# Patient Record
Sex: FEMALE | Race: WHITE | NOT HISPANIC OR LATINO | URBAN - METROPOLITAN AREA
[De-identification: names, ages, dates, MRNs, and addresses within clinical notes are randomized per-mention and may not be internally consistent; named-entity substitution may affect disease eponyms.]

---

## 2024-02-10 ENCOUNTER — HOSPITAL ENCOUNTER (OUTPATIENT)
Dept: RADIOLOGY | Facility: HOSPITAL | Age: 39
Discharge: HOME | End: 2024-02-10
Payer: MEDICAID

## 2024-02-10 DIAGNOSIS — M26.603 BILATERAL TEMPOROMANDIBULAR JOINT DISORDER: ICD-10-CM

## 2024-02-10 PROCEDURE — 70336 MAGNETIC IMAGE JAW JOINT: CPT

## 2024-02-10 PROCEDURE — 70336 MAGNETIC IMAGE JAW JOINT: CPT | Performed by: RADIOLOGY

## 2024-03-01 NOTE — PROGRESS NOTES
Patient: Marilyn Acosta  : 1985 AGE: 38 y.o. SEX:female   MRN: [id number]   Provider: JULIETTE Wolfe-CNP     Location [facility]   Service Date: 3/4/2024     PCP: Chas Paniagua MD   Referred by: Carlos Walsh DDS          [facility]  New Visit Note      HISTORY OF PRESENT ILLNESS     Marilyn Acosta is a 38 y.o. female with a h/o Depression, Anxiety, TMJ, Vitamin D def, Fibromyalgia, positive ANTONIA, chronic neck and low back pain/arthritis s/p cervical disk surgery x2 (2018 and 2019) , + Raynaud's, ADHD on Adderall, GERD, allergic rhinitis who presents to [facility] for a comprehensive sleep medicine evaluation with concerns of RADHA re evaluation.     Previously followed by [facility] ENT. Currently following with Dr. Walsh ([facility]). Requesting new sleep study. Patient reports loud snoring, gasping for air in sleep, multiple nighttime awakenings, un freshed sleep, EDS.      Extensive RADHA history- originally dx around . S/p OR 14 septoplasty, bilateral inferior turbinate reduction, UPPP, midline glossectomy for RADHA (AHI16, did not tolerate CPAP). Failed CPAP in past despite multiple mask and pressure settings. Had DISE in 2018- patient's insurance did not approve Inspire surgery at that time. She was offered options of MMA surgery or Mandibular advancement device but put things on hold during the Covid pandemic. Reports constant teeth grinding/clenching and has now been following with Dr. Walsh at [facility] orthodont[facility].       SLEEP STUDY HISTORY (personally reviewed raw data such as interpretation report, data sheet, hypnogram, and titration table if available and applicable)    - PSG 7/3/22 at [facility]- showing mild RADHA with RDI of 6.3 by CMS criteria vs. an AHI of 7.7 by AASM criteria, obstructive events, worse in REM sleep, and not associated with oxyhemoglobin desaturations to a gena of 92.0%. (BMI  24.6 kg/m2; ESS 12 out of 24 during this study)  (PSG (8/14) with an AHI of 16.2, PSG (9/17) with CPAP at 11 cm H2O adequate,  s/p septoplasty, UP3 and BOT reduction, PSG (4/15) with an AHI of 12.5, PSG (6/15) with  CPAP 4-8 cm H2O not effective, PSG (8/18) with an AHI of 1.4 by CMS criteria vs. 5.3 by AASM criteria, PSG (9/18) with an AHI of 0.7 by CMS criteria vs 19.8 by AASM criteria).     DISE findings 8/27/2018 [facility] : Incomplete and partial anterior-to-posterior obstruction at the velopharynx and oropharynx with stable lateralized pharyngeal sidewalls. No concentric collapse. In the setting of mild retrognathia, there is a complete AP base of tongue ptosis and obstruction with no lingual tonsil hypertrophy and no epiglottic obstruction. By performing a moderate to significant amount of jaw thrust, her base of tongue obstruction alleviates. Neck extension does not improve the base of tongue obstruction significantly.     SLEEP-WAKE SCHEDULE    Sleep schedule  on weekdays / work days:  Usual Bedtime:  11pm  Subjective sleep latency: 30min  # of awakenings: Yes, about 3 times a night  Wake time:  7:15am  Naps: No  Average sleep duration (excluding naps): 7 hours    Sleep schedule on weekends/non work days :  same as weekdays    SLEEP ENVIRONMENT  Sleep status: sleeps with   Preferred sleep position: stomach and side  Room is dark: Yes  Room is quiet: Yes  Room is cool: Yes  Bed comfort: good    SLEEP HABITS  Caffeine consumption: Yes, 1 cups/day  Alcohol consumption: Yes, rarely (occasional)  Smoking: Yes (1 pack per week)   Marijuana: denies  Sleep aids: denies    SLEEP ROS  Sleep Initiation: no problems going to sleep    Sleep Maintenance: wakes up about 3 times per night and easily returns to sleep after awakening  Problems staying asleep associated with: Problems Staying Asleep: breathing problems, no clear reason, and chronic pain    Breathing during sleep: snoring, witnessed apneas,  gasping/choking for air, mouth breathing, night sweats, and teeth grinding      Sleep-related behaviors: DENIES  dreams upon falling asleep  hypnagogic/hypnopompic hallucinations  sleep paralysis  sleep attacks  symptoms of cataplexy  sleep walking  kicking, punching, or acting out dreams  sleep eating  nightmares      Daytime Symptoms  On awakening patient reports:  stimulant use (see med list)    Patient report some daytime symptoms including: DAYTIME SYMPTOMS: reports excessive daytime sleepiness sleep inertia irritability during the day difficulty with memory or concentration during the day  Patient denies daytime symptoms including: Denies: late to work/school due to sleepiness feeling sleepy when driving  Fatigue: struggles to carry out day to day responsibilities.    RLS screen: Patient denies RLS symptoms.    WEIGHT: lost 30lbs in 1 year      ESS: 11  CLOTILDE: 18    REVIEW OF SYSTEMS     All other systems have been reviewed and are negative.    ALLERGIES     No Known Allergies    MEDICATIONS     Current Outpatient Medications   Medication Sig Dispense Refill    amphetamine-dextroamphetamine XR (Adderall XR) 30 mg 24 hr capsule Take 1 capsule (30 mg) by mouth once daily in the morning. Do not crush or chew.      buPROPion XL (Wellbutrin XL) 150 mg 24 hr tablet Take 1 tablet (150 mg) by mouth once daily. Do not crush, chew, or split.      busPIRone (Buspar) 5 mg tablet Take by mouth 2 times a day.      cevimeline (Evoxac) 30 mg capsule Take 1 capsule (30 mg) by mouth 3 times a day.      erenumab (Aimovig Autoinjector) 140 mg/mL injection Inject 1 mL (140 mg) under the skin every 28 (twenty-eight) days.      famotidine (Pepcid) 40 mg tablet Take by mouth.      hydroxychloroquine (Plaquenil) 200 mg tablet Take by mouth.      hydrOXYzine HCL (Atarax) 10 mg tablet Take by mouth.      pentoxifylline (Trental) 400 mg ER tablet Take by mouth 3 times a day with meals. Do not crush, chew, or split.      pregabalin (Lyrica)  225 mg capsule Take 1 capsule (225 mg) by mouth 2 times a day.      spironolactone (Aldactone) 100 mg tablet Take 1 tablet (100 mg) by mouth once daily.      traMADol (Ultram) 50 mg tablet Take by mouth.      valACYclovir (Valtrex) 1 gram tablet Take 1 tablet (1,000 mg) by mouth 2 times a day.       No current facility-administered medications for this visit.       PAST HISTORIES     PERTINENT PAST MEDICAL HISTORY: See HPI    PERTINENT PAST SURGICAL HISTORY for Sleep Medicine:    bariatric surgery in [place] in 2019      ACDF, ANTERIOR CERVICAL N/A 5/24/2018   Procedure: ACDF, ANTERIOR CERVICAL// SSEPS, 6/7; Surgeon: Trevon Dennis MD; Location: PERIOPERATIVE SERVICES; Service: Orthopaedics    BRONCHOSCOPY, FLEXIBLE, DRUG INDUCED SLEEP ENDOSCOPY (DISE) N/A 8/27/2018   Procedure: BRONCHOSCOPY, FLEXIBLE, DRUG INDUCED SLEEP ENDOSCOPY (DISE); Surgeon: Lul Pastrana MD; Location: [facility] [place]; Service: Otolaryngology    GLOSSECTOMY, PARTIAL N/A 11/26/2014   Procedure: GLOSSECTOMY, PARTIAL; Surgeon: Krissy Malik MD; Location: PERIOPERATIVE SERVICES; Service: Otolaryngology    REDUCTION, TURBINATE Bilateral 11/26/2014   Procedure: REDUCTION, TURBINATE; Surgeon: Krissy Malik MD; Location: PERIOPERATIVE SERVICES; Service: Otolaryngology    SEPTOPLASTY N/A 11/26/2014   Procedure: SEPTOPLASTY; Surgeon: rKissy Malik MD; Location: PERIOPERATIVE SERVICES; Service: Otolaryngology    TONSILLECTOMY Bilateral 11/26/2014   Procedure: TONSILLECTOMY; Surgeon: Krissy Malik MD; Location: PERIOPERATIVE SERVICES; Service: Otolaryngology    TRANSPOSITION, NERVE, ULNAR Left 12/1/2017   Procedure: DECOMPRESSION POSSIBLE TRANSPOSITION, NERVE, ULNAR; Surgeon: Tahir Burks MD; Location: [facility] [place]; Service: Hand    UVULOPALATOPHARYNGOPLASTY N/A 11/26/2014   Procedure: UVULOPALATOPHARYNGOPLASTY; Surgeon: Krissy Malik MD; Location: PERIOPERATIVE SERVICES; Service: Otolaryngology     PERTINENT FAMILY  "HISTORY for Sleep Medicine:  Patient denies family history of any sleep disorder.    PERTINENT SOCIAL HISTORY:  She  reports that she has been smoking cigarettes. She has been smoking an average of .25 packs per day. She has never used smokeless tobacco. She reports that she does not currently use alcohol. No history on file for drug use. 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     Active Problems, Allergy List, Medication List, and PMH/PSH/FH/Social Hx have been reviewed and reconciled in chart. No significant changes unless documented in the pertinent chart section. Updates made when necessary.     PHYSICAL EXAM     VITAL SIGNS: /78 (BP Location: Left arm, Patient Position: Sitting, BP Cuff Size: Adult)   Pulse 93   Temp 37.2 °C (99 °F) (Temporal)   Resp 16   Ht 1.626 m (5' 4\")   Wt 54.4 kg (120 lb)   SpO2 98%   BMI 20.60 kg/m²       CURRENT WEIGHT:   Vitals:    03/04/24 1451   Weight: 54.4 kg (120 lb)        PREVIOUS WEIGHTS:  Wt Readings from Last 3 Encounters:   03/04/24 54.4 kg (120 lb)   05/23/22 68 kg (150 lb)       Physical Exam  Constitutional: Awake, not in distress  Lungs: Clear to auscultation bilateral, no cough noted  Heart: Regular rate and rhythm  Skin: Warm, no rash  Neuro: No tremors, moves all extremities  Psych: alert and oriented to time, place, and person    HEENT:   retrognathia +   palatal changes consistent with UPPP    Tongue scalloping: no   Modified Mallampati score - 2        RESULTS/DATA     No results found for: \"IRON\", \"TRANSFERRIN\", \"IRONSAT\", \"TIBC\", \"FERRITIN\"    No results found for: \"CO2\"    ASSESSMENT/PLAN     Ms. Acosta is a 38 y.o. female and She was referred to the [facility] for evaluation of RADHA    Problem List, Orders, Assessment, Recommendations:    #RADHA  - status post extensive soft tissue surgery including ITR, septoplasty, UPPP, BOT reduction   - as she " remains symptomatic, needs repeat sleep study for re evaluation, PSG ordered- will call with results --->patient plans to follow with [facility]  Dr. Walsh & [facility] for possible MMA  - multiple records reviewed from [facility] including previous sleep studies and ENT notes- Dr. Pastrana, Dr. Handy  -Diet, exercise, and weight loss were emphasized today in clinic, as were non-supine sleep, avoiding alcohol in the late evening, and driving or operating heavy machinery when sleepy.     #ALLERGIC  RHINITIS   ALREADY ON ZYRTEC AND FLONASE   SEES ENT    #ADHD  - On adderall per psychiatry, defer management     All of patient's questions were answered. She verbalizes understanding and agreement with my assessment and plan.    Disposition    Will call with sleep study results and determine F/U plan  (has follow up with ortho Dr. Nico mejia)

## 2024-03-04 ENCOUNTER — OFFICE VISIT (OUTPATIENT)
Dept: SLEEP MEDICINE | Facility: CLINIC | Age: 39
End: 2024-03-04
Payer: MEDICAID

## 2024-03-04 VITALS
DIASTOLIC BLOOD PRESSURE: 78 MMHG | TEMPERATURE: 99 F | SYSTOLIC BLOOD PRESSURE: 110 MMHG | RESPIRATION RATE: 16 BRPM | BODY MASS INDEX: 20.49 KG/M2 | HEART RATE: 93 BPM | HEIGHT: 64 IN | WEIGHT: 120 LBS | OXYGEN SATURATION: 98 %

## 2024-03-04 DIAGNOSIS — F90.9 ATTENTION DEFICIT HYPERACTIVITY DISORDER (ADHD), UNSPECIFIED ADHD TYPE: ICD-10-CM

## 2024-03-04 DIAGNOSIS — G47.30 SLEEP APNEA IN ADULT: ICD-10-CM

## 2024-03-04 DIAGNOSIS — J30.9 ALLERGIC RHINITIS, UNSPECIFIED SEASONALITY, UNSPECIFIED TRIGGER: ICD-10-CM

## 2024-03-04 DIAGNOSIS — Z98.890 HISTORY OF UVULOPALATOPHARYNGOPLASTY: ICD-10-CM

## 2024-03-04 DIAGNOSIS — G47.33 OSA (OBSTRUCTIVE SLEEP APNEA): Primary | ICD-10-CM

## 2024-03-04 PROCEDURE — 99205 OFFICE O/P NEW HI 60 MIN: CPT | Performed by: NURSE PRACTITIONER

## 2024-03-04 RX ORDER — PREGABALIN 225 MG/1
225 CAPSULE ORAL 2 TIMES DAILY
COMMUNITY

## 2024-03-04 RX ORDER — TRAMADOL HYDROCHLORIDE 50 MG/1
TABLET ORAL
COMMUNITY

## 2024-03-04 RX ORDER — SPIRONOLACTONE 100 MG/1
100 TABLET, FILM COATED ORAL DAILY
COMMUNITY

## 2024-03-04 RX ORDER — DEXTROAMPHETAMINE SACCHARATE, AMPHETAMINE ASPARTATE MONOHYDRATE, DEXTROAMPHETAMINE SULFATE AND AMPHETAMINE SULFATE 7.5; 7.5; 7.5; 7.5 MG/1; MG/1; MG/1; MG/1
30 CAPSULE, EXTENDED RELEASE ORAL EVERY MORNING
COMMUNITY

## 2024-03-04 RX ORDER — BUPROPION HYDROCHLORIDE 150 MG/1
150 TABLET ORAL DAILY
COMMUNITY

## 2024-03-04 RX ORDER — HYDROXYZINE HYDROCHLORIDE 10 MG/1
TABLET, FILM COATED ORAL
COMMUNITY

## 2024-03-04 RX ORDER — CEVIMELINE HYDROCHLORIDE 30 MG/1
30 CAPSULE ORAL 3 TIMES DAILY
COMMUNITY

## 2024-03-04 RX ORDER — HYDROXYCHLOROQUINE SULFATE 200 MG/1
TABLET, FILM COATED ORAL
COMMUNITY

## 2024-03-04 RX ORDER — VALACYCLOVIR HYDROCHLORIDE 1 G/1
1000 TABLET, FILM COATED ORAL 2 TIMES DAILY
COMMUNITY

## 2024-03-04 RX ORDER — PENTOXIFYLLINE 400 MG/1
TABLET, EXTENDED RELEASE ORAL
COMMUNITY

## 2024-03-04 RX ORDER — ERENUMAB-AOOE 140 MG/ML
140 INJECTION, SOLUTION SUBCUTANEOUS
COMMUNITY

## 2024-03-04 RX ORDER — FAMOTIDINE 40 MG/1
TABLET, FILM COATED ORAL
COMMUNITY

## 2024-03-04 RX ORDER — BUSPIRONE HYDROCHLORIDE 5 MG/1
TABLET ORAL 2 TIMES DAILY
COMMUNITY

## 2024-03-04 ASSESSMENT — SLEEP AND FATIGUE QUESTIONNAIRES
SITING INACTIVE IN A PUBLIC PLACE LIKE A CLASS ROOM OR A MOVIE THEATER: WOULD NEVER DOZE
ESS-CHAD TOTAL SCORE: 11
DIFFICULTY_FALLING_ASLEEP: MILD
HOW LIKELY ARE YOU TO NOD OFF OR FALL ASLEEP WHILE LYING DOWN TO REST IN THE AFTERNOON WHEN CIRCUMSTANCES PERMIT: HIGH CHANCE OF DOZING
HOW LIKELY ARE YOU TO NOD OFF OR FALL ASLEEP WHILE SITTING AND READING: MODERATE CHANCE OF DOZING
HOW LIKELY ARE YOU TO NOD OFF OR FALL ASLEEP WHILE SITTING AND TALKING TO SOMEONE: WOULD NEVER DOZE
SATISFACTION_WITH_CURRENT_SLEEP_PATTERN: DISSATISFIED
HOW LIKELY ARE YOU TO NOD OFF OR FALL ASLEEP WHILE WATCHING TV: MODERATE CHANCE OF DOZING
DIFFICULTY_STAYING_ASLEEP: MODERATE
HOW LIKELY ARE YOU TO NOD OFF OR FALL ASLEEP IN A CAR, WHILE STOPPED FOR A FEW MINUTES IN TRAFFIC: SLIGHT CHANCE OF DOZING
WAKING_TOO_EARLY: VERY SEVERE
HOW LIKELY ARE YOU TO NOD OFF OR FALL ASLEEP WHILE SITTING QUIETLY AFTER LUNCH WITHOUT ALCOHOL: SLIGHT CHANCE OF DOZING
HOW LIKELY ARE YOU TO NOD OFF OR FALL ASLEEP WHEN YOU ARE A PASSENGER IN A CAR FOR AN HOUR WITHOUT A BREAK: MODERATE CHANCE OF DOZING
SLEEP_PROBLEM_INTERFERES_DAILY_ACTIVITIES: MUCH
SLEEP_PROBLEM_NOTICEABLE_TO_OTHERS: A LITTLE
WORRIED_DISTRESSED_DUE_TO_SLEEP: VERY MUCH NOTICEABLE

## 2024-03-04 NOTE — PATIENT INSTRUCTIONS
[facility]  [address] [place] [address]       NAME: Marilyn Acosta   DATE: 03/04/24    Your Sleep Provider Today: SANA Wolfe  Your Primary Care Physician: Chas Paniagua MD       DIAGNOSIS:   1. RADHA (obstructive sleep apnea)  In-Center Sleep Study (Sleep Provider Only)      2. Sleep apnea in adult  Referral to Adult Sleep Medicine          Thank you for coming to the Sleep Medicine Clinic today! Your sleep medicine provider today was: SANA Wolfe Below is a summary of your treatment plan, other important information, and our contact numbers:      TREATMENT PLAN     - Get your sleep study scheduled  - If not already done, sign up for 'My Chart' and send prescription requests or messages through this  - Will call with results once available.     Scheduling a Sleep Study      Your provider ordered a sleep apnea test today. It will usually take 2 - 3 business days for Insurance to approve the order.     Once you test is approved it will be sent to the ordering sleep lab. When the sleep lab is notified of the new order, they will reach out to you to get you scheduled for an in-lab sleep study or to get you scheduled for a pick-up date for your Home Sleep Study Kit (depending on which type of study your provider recommended).    They usually will reach out to you about 1 week after your test has been ordered. Please contact the office at [phone] if you have not heard back from them in 2 weeks after you have seen your provider. See below for list of sleep lab contact numbers, if needed.     Sleep Testing for sleep apnea: The best and ideal way to check out if you have sleep apnea is to do an overnight sleep study in the sleep laboratory. Alternatively, a home sleep apnea test can also be done depending on your insurance and risk factors.     If you are having a home sleep apnea test,  kindly allot 1 hour during pickup of the testing kit as you will have to complete paperwork and listen to the sleep technician for in-person on-the-spot demonstration and instructions on how to hook up the testing kit at home. Do the test for 1 day and start off with sleeping on your back. If you sleep on your side in the middle of night or you have always been a side or stomach-sleeper, it is ok as long as you have some time on your back and off-back.     If you are having an overnight in-lab sleep study, please make sure to bring toiletries, a comfy pillow, additional warm blankets, and any nighttime medications (include as-needed inhaler, pain pill, etc) that you may regularly take. Also, be sure to eat dinner before you arrive as we generally do not provide meals inside the sleep testing center. Lastly, in order to fall asleep faster in the sleep testing center, we advise patients to wake up 2 hours earlier on the morning of scheduled testing and avoid napping 2 days prior testing. Sometimes, your sleep provider may prescribe a sleep aid to be taken at lights out in the sleep testing center. If you are taking a sleep aid, consider having somebody pick you up after the sleep testing.    Overnight sleep studies may be scheduled on a weekday or weekend. We also perform daytime testing for shift workers on a case-by-case basis.    Once you have booked an appointment to do the sleep study, please contact my office for follow-up visit to discuss results.       IMPORTANT INFORMATION     Call 911 for medical emergencies.  Our offices are generally open from Monday-Friday, 9 am - 5 pm.  If you need to get in touch with me, you may either call me/my team (number is below) or you can use GetApp.  If a referral for a test, for CPAP, or for another specialist was made, and you have not heard about scheduling this within a week, please call scheduling at [phone] ([phone]).  If you are unable to make your appointment for  "clinic or an overnight study, kindly call the office at least 48 hours in advance to cancel and reschedule.  If you are on CPAP, please bring your device's card or the device to each clinic appointment.   There are no supporting services by either the sleep doctors or their staff on weekends and Holidays, or after 5 PM on weekdays.   If you have been asked to come to a sleep study, make sure you bring toiletries, a comfy pillow, and any nighttime medications that you may regularly take. Also be sure to eat dinner before you arrive. We generally do not provide meals.      PRESCRIPTIONS     We require 7 days advanced notice for prescription refills. If we do not receive the request in this time, we cannot guarantee that your medication will be refilled in time.      IMPORTANT PHONE NUMBERS       Appointments (for Adult Sleep Clinic): [phone] - option 2  Appointments (For Sleep Studies): [phone] - option 3  Behavioral Sleep Medicine: [phone]  Sleep Surgery: [phone]  ENT (Otolaryngology): [phone]  Headache Clinic (Neurology): [phone]  Neurology: [phone]  Psychiatry: [phone]  Pulmonary Function Testing (PFT) Center: [phone]  Pulmonary Medicine: [phone]  [facility] (DME): [phone]  [facility] (DME): [phone]  [facility]): [phone]        CONTACTING YOUR SLEEP MEDICINE PROVIDER AND SLEEP TEAM      For issues with your machine or mask interface, please call your DME provider first. DME stands for durable medical company. DME is the company who provides you the machine and/or PAP supplies / accessories.   To schedule, cancel, or reschedule SLEEP STUDY APPOINTMENTS, please call the Main Phone Line at [phone] - option 3.   To schedule, cancel, or reschedule CLINIC APPOINTMENTS, you can do it in \"MyChart\", call [phone] for [facility] office , [phone] for [facility] office to speak with my on site " "staff, or call the Main Phone Line at [phone] - option 2  For CLINICAL QUESTIONS or MEDICATION REFILLS, please call direct line for Adult Sleep Nurses at [phone].   Lastly, you can also send a message directly to your provider through \"My Chart\", which is the email service through your  Records Account: [email or web address]     Adult Sleep Nurses (Amita Csasidy, RN and Ana Laura Bingham RN):  For clinical questions and refilling prescriptions: [phone]  Email sleep diaries and other documents at: [email or web address]    Office locations for Chelsea Perla NP:    [facility]   [id number] [place] [address] [address]   [address]  [place] [address] [phone]    [address] [facility].  [address]  [place] [address] [phone]      OUR SLEEP TESTING LOCATIONS     Our team will contact you to schedule your sleep study, however, you can contact us as follow:  Main Phone Line (scheduling only): [phone]), option 3    Sleep Testing Locations:   [place] (18 years and older): [address] [place] [address], 2nd floor   [place] (18 years and older): [address] [facility]; 4th floor  After hours line: [phone]   [place] (18 years and older) at [facility]: [address] [place] [address]  After hours line: [phone]   [facility] at [facility] (Main campus: All ages): [facility], 6th floor. After hours line: [phone]   [place] (5 years and older; younger considered on case-by-case basis): [address] [facility]; [employer] [facility] [address], [address]. Scheduling  After hours line: [phone]       Here at [facility], we wish you a restful sleep!    Your sleep medicine provider for this visit was: Chelsea Perla, APRN-CNP   "

## 2024-03-05 PROBLEM — Z98.890 HISTORY OF UVULOPALATOPHARYNGOPLASTY: Status: ACTIVE | Noted: 2024-03-05

## 2024-03-05 PROBLEM — J30.9 ALLERGIC RHINITIS: Status: ACTIVE | Noted: 2024-03-05

## 2024-03-05 PROBLEM — F90.9 ATTENTION DEFICIT HYPERACTIVITY DISORDER (ADHD): Status: ACTIVE | Noted: 2024-03-05

## 2024-04-30 ENCOUNTER — CLINICAL SUPPORT (OUTPATIENT)
Dept: SLEEP MEDICINE | Facility: CLINIC | Age: 39
End: 2024-04-30
Payer: MEDICAID

## 2024-04-30 DIAGNOSIS — G47.63 SLEEP RELATED BRUXISM: ICD-10-CM

## 2024-04-30 DIAGNOSIS — G47.33 OSA (OBSTRUCTIVE SLEEP APNEA): ICD-10-CM

## 2024-04-30 PROCEDURE — 95810 POLYSOM 6/> YRS 4/> PARAM: CPT | Performed by: INTERNAL MEDICINE

## 2024-05-01 VITALS — BODY MASS INDEX: 21.34 KG/M2 | HEIGHT: 64 IN | WEIGHT: 125 LBS

## 2024-05-01 ASSESSMENT — SLEEP AND FATIGUE QUESTIONNAIRES
HOW LIKELY ARE YOU TO NOD OFF OR FALL ASLEEP IN A CAR, WHILE STOPPED FOR A FEW MINUTES IN TRAFFIC: SLIGHT CHANCE OF DOZING
HOW LIKELY ARE YOU TO NOD OFF OR FALL ASLEEP WHILE SITTING QUIETLY AFTER LUNCH WITHOUT ALCOHOL: SLIGHT CHANCE OF DOZING
HOW LIKELY ARE YOU TO NOD OFF OR FALL ASLEEP WHILE WATCHING TV: WOULD NEVER DOZE
SITING INACTIVE IN A PUBLIC PLACE LIKE A CLASS ROOM OR A MOVIE THEATER: SLIGHT CHANCE OF DOZING
HOW LIKELY ARE YOU TO NOD OFF OR FALL ASLEEP WHILE SITTING AND TALKING TO SOMEONE: SLIGHT CHANCE OF DOZING
HOW LIKELY ARE YOU TO NOD OFF OR FALL ASLEEP WHEN YOU ARE A PASSENGER IN A CAR FOR AN HOUR WITHOUT A BREAK: MODERATE CHANCE OF DOZING
HOW LIKELY ARE YOU TO NOD OFF OR FALL ASLEEP WHILE SITTING AND READING: MODERATE CHANCE OF DOZING
ESS-CHAD TOTAL SCORE: 11
HOW LIKELY ARE YOU TO NOD OFF OR FALL ASLEEP WHILE LYING DOWN TO REST IN THE AFTERNOON WHEN CIRCUMSTANCES PERMIT: HIGH CHANCE OF DOZING

## 2024-05-01 NOTE — PROGRESS NOTES
[facility] TECH NOTE:     Patient: Marilyn Acosta   MRN//AGE: [id number]  1985  38 y.o.   Technologist: Jill South   Room: 2   Service Date: 2024        Sleep Testing Location: [facility]    [place]: 11    TECHNOLOGIST SLEEP STUDY PROCEDURE NOTE:   This sleep study is being conducted according to the policies and procedures outlined by the AAS accreditation standards.  The sleep study procedure and processes involved during this appointment was explained to the patient/patient’s family, questions were answered. The patient/family verbalized understanding.      The patient is a 38 y.o. year old female scheduled for a Diagnostic PSG  with montage of: Standard PSG.     The study that was ultimately completed was a Diagostic PSG with montage of:  Standard PSG .    The full study Was completed.  Patient questionnaires completed?: yes     Consents signed? yes    Initial Fall Risk Screening:     Marilyn has not fallen in the last 6 months. Marilyn does not have a fear of falling. She does not need assistance with sitting, standing, or walking. She does not need assistance walking in her home. She does not need assistance in an unfamiliar setting. The patient is notusing an assistive device.     Brief Study observations: Patient is a 38 year old female here for a Diagnostic PSG.  Patient states that she is pursuing maxillomandibular advancement surgery and is not interested in trying CPAP therapy again as she has tried many times in the past.  No snore was observed.  Minimal respiratory disturbances observed.  All sleep stages observed.  Frequent bruxism observed during recording.       Deviation to order/protocol and reason: None      If PAP, which was preferred mask/pressure/mode: NA      Other:None    After the procedure, the patient/family was informed to ensure followup with ordering clinician for testing results.      Technologist: CLARISSA Kelly

## 2024-07-10 ENCOUNTER — APPOINTMENT (OUTPATIENT)
Dept: PAIN MEDICINE | Facility: CLINIC | Age: 39
End: 2024-07-10
Payer: MEDICAID

## 2024-07-19 ENCOUNTER — OFFICE VISIT (OUTPATIENT)
Dept: PAIN MEDICINE | Facility: CLINIC | Age: 39
End: 2024-07-19
Payer: MEDICAID

## 2024-07-19 VITALS
HEART RATE: 95 BPM | TEMPERATURE: 97.2 F | DIASTOLIC BLOOD PRESSURE: 81 MMHG | SYSTOLIC BLOOD PRESSURE: 121 MMHG | OXYGEN SATURATION: 98 % | RESPIRATION RATE: 10 BRPM

## 2024-07-19 DIAGNOSIS — M54.16 LUMBAR RADICULOPATHY: ICD-10-CM

## 2024-07-19 DIAGNOSIS — M54.12 CERVICAL RADICULOPATHY: Primary | ICD-10-CM

## 2024-07-19 DIAGNOSIS — M79.7 FIBROMYALGIA: ICD-10-CM

## 2024-07-19 PROCEDURE — 99214 OFFICE O/P EST MOD 30 MIN: CPT | Performed by: ANESTHESIOLOGY

## 2024-07-19 PROCEDURE — 99204 OFFICE O/P NEW MOD 45 MIN: CPT | Performed by: ANESTHESIOLOGY

## 2024-07-19 RX ORDER — TIZANIDINE 2 MG/1
2-4 TABLET ORAL EVERY 8 HOURS PRN
Qty: 30 TABLET | Refills: 0 | Status: SHIPPED | OUTPATIENT
Start: 2024-07-19 | End: 2024-07-29

## 2024-07-19 ASSESSMENT — PAIN SCALES - GENERAL
PAINLEVEL: 8
PAINLEVEL_OUTOF10: 8

## 2024-07-19 ASSESSMENT — ENCOUNTER SYMPTOMS
DYSURIA: 0
EYE PAIN: 0
ADENOPATHY: 0
LOSS OF SENSATION IN FEET: 0
FEVER: 0
WEAKNESS: 0
BACK PAIN: 1
NUMBNESS: 1
NECK PAIN: 1
ABDOMINAL PAIN: 0
OCCASIONAL FEELINGS OF UNSTEADINESS: 0
SHORTNESS OF BREATH: 1
DEPRESSION: 1

## 2024-07-19 ASSESSMENT — PATIENT HEALTH QUESTIONNAIRE - PHQ9
4. FEELING TIRED OR HAVING LITTLE ENERGY: NEARLY EVERY DAY
2. FEELING DOWN, DEPRESSED OR HOPELESS: NEARLY EVERY DAY
3. TROUBLE FALLING OR STAYING ASLEEP: NEARLY EVERY DAY
10. IF YOU CHECKED OFF ANY PROBLEMS, HOW DIFFICULT HAVE THESE PROBLEMS MADE IT FOR YOU TO DO YOUR WORK, TAKE CARE OF THINGS AT HOME, OR GET ALONG WITH OTHER PEOPLE: VERY DIFFICULT
6. FEELING BAD ABOUT YOURSELF - OR THAT YOU ARE A FAILURE OR HAVE LET YOURSELF OR YOUR FAMILY DOWN: NOT AT ALL
SUM OF ALL RESPONSES TO PHQ QUESTIONS 1-9: 12
7. TROUBLE CONCENTRATING ON THINGS, SUCH AS READING THE NEWSPAPER OR WATCHING TELEVISION: NEARLY EVERY DAY
1. LITTLE INTEREST OR PLEASURE IN DOING THINGS: NOT AT ALL
9. THOUGHTS THAT YOU WOULD BE BETTER OFF DEAD, OR OF HURTING YOURSELF: NOT AT ALL
8. MOVING OR SPEAKING SO SLOWLY THAT OTHER PEOPLE COULD HAVE NOTICED. OR THE OPPOSITE, BEING SO FIGETY OR RESTLESS THAT YOU HAVE BEEN MOVING AROUND A LOT MORE THAN USUAL: NOT AT ALL
5. POOR APPETITE OR OVEREATING: NOT AT ALL
SUM OF ALL RESPONSES TO PHQ9 QUESTIONS 1 & 2: 3

## 2024-07-19 ASSESSMENT — ANXIETY QUESTIONNAIRES
4. TROUBLE RELAXING: NEARLY EVERY DAY
IF YOU CHECKED OFF ANY PROBLEMS ON THIS QUESTIONNAIRE, HOW DIFFICULT HAVE THESE PROBLEMS MADE IT FOR YOU TO DO YOUR WORK, TAKE CARE OF THINGS AT HOME, OR GET ALONG WITH OTHER PEOPLE: VERY DIFFICULT
1. FEELING NERVOUS, ANXIOUS, OR ON EDGE: NEARLY EVERY DAY
6. BECOMING EASILY ANNOYED OR IRRITABLE: NEARLY EVERY DAY
3. WORRYING TOO MUCH ABOUT DIFFERENT THINGS: NEARLY EVERY DAY
5. BEING SO RESTLESS THAT IT IS HARD TO SIT STILL: NEARLY EVERY DAY
GAD7 TOTAL SCORE: 15
2. NOT BEING ABLE TO STOP OR CONTROL WORRYING: NOT AT ALL
7. FEELING AFRAID AS IF SOMETHING AWFUL MIGHT HAPPEN: NOT AT ALL

## 2024-07-19 ASSESSMENT — LIFESTYLE VARIABLES
AUDIT-C TOTAL SCORE: 1
HOW MANY STANDARD DRINKS CONTAINING ALCOHOL DO YOU HAVE ON A TYPICAL DAY: 1 OR 2
HOW OFTEN DO YOU HAVE A DRINK CONTAINING ALCOHOL: MONTHLY OR LESS
HOW OFTEN DO YOU HAVE SIX OR MORE DRINKS ON ONE OCCASION: NEVER
SKIP TO QUESTIONS 9-10: 1

## 2024-07-19 ASSESSMENT — COLUMBIA-SUICIDE SEVERITY RATING SCALE - C-SSRS
1. IN THE PAST MONTH, HAVE YOU WISHED YOU WERE DEAD OR WISHED YOU COULD GO TO SLEEP AND NOT WAKE UP?: NO
2. HAVE YOU ACTUALLY HAD ANY THOUGHTS OF KILLING YOURSELF?: NO
6. HAVE YOU EVER DONE ANYTHING, STARTED TO DO ANYTHING, OR PREPARED TO DO ANYTHING TO END YOUR LIFE?: NO

## 2024-07-19 ASSESSMENT — PAIN - FUNCTIONAL ASSESSMENT: PAIN_FUNCTIONAL_ASSESSMENT: 0-10

## 2024-07-19 ASSESSMENT — PAIN DESCRIPTION - DESCRIPTORS: DESCRIPTORS: BURNING;TIGHTNESS;DISCOMFORT

## 2024-07-19 NOTE — PROGRESS NOTES
Chief Complain    Pain (Npv. Here for neck pain. Had infusions in 2019 and 2020. Pain level is 8 with pinching . Has tried to get mri but has been denied. Physical therapy 10 months ago. Has been using tramadol with no relief. Has had percocet/vicodin and valium, which has helped. Sees psych.)    History Of Present Illness  Marilyn Acosta is a 39 y.o. female here for evaluation of neck and low back pain, radiating to bilateral shoulder blades. The patient has been experiencing these symptoms for last several year(s). The patient describes the pain as sharp, dull, shooting, tingling. The patient's current pain score is 8 on a scale from 0-10. The pain is worsened by prolonged sitting and is alleviated by nothing relieves the pain. Since the start of the symptoms the pain has been unchanged.    The patient denies any fever,  weight loss, weakness, bladder/ bowel incontinence, history of cancer, history of IV drug abuse, recent trauma.    S/p C6-7 fusion in 2019,2020    She did 20-24 sessions of PT last year- with out any benefit [facility] in [facility].     Past Medical History  She has a past medical history of Personal history of other diseases of the nervous system and sense organs.    Surgical History  She has a past surgical history that includes Other surgical history (05/06/2022) and Other surgical history (05/06/2022).    Social History  She reports that she has been smoking cigarettes. She has never used smokeless tobacco. She reports that she does not currently use alcohol. She reports that she does not currently use drugs.    Family History  No family history on file.     Allergies  Patient has no known allergies.    Review of Systems  Review of Systems   Constitutional:  Negative for fever.   HENT:  Negative for ear pain.    Eyes:  Negative for pain.   Respiratory:  Positive for shortness of breath.    Cardiovascular:  Negative for chest pain.   Gastrointestinal:  Negative for  abdominal pain.   Endocrine: Negative for cold intolerance and heat intolerance.   Genitourinary:  Negative for dysuria.   Musculoskeletal:  Positive for back pain and neck pain.   Skin:  Negative for rash.   Allergic/Immunologic: Negative for food allergies.   Neurological:  Positive for numbness. Negative for weakness.   Hematological:  Negative for adenopathy.   Psychiatric/Behavioral:  Negative for suicidal ideas.         Physical Exam  Physical Exam  Constitutional:       Appearance: Normal appearance.   HENT:      Head: Normocephalic and atraumatic.   Eyes:      Extraocular Movements: Extraocular movements intact.   Cardiovascular:      Rate and Rhythm: Normal rate and regular rhythm.   Pulmonary:      Effort: Pulmonary effort is normal.   Abdominal:      Palpations: Abdomen is soft.   Musculoskeletal:      Cervical back: Neck supple. Decreased range of motion.        Back:    Skin:     General: Skin is warm.   Neurological:      Mental Status: She is alert and oriented to person, place, and time.      Motor: Motor function is intact.      Coordination: Coordination is intact.      Gait: Gait is intact.      Deep Tendon Reflexes:      Reflex Scores:       Patellar reflexes are 3+ on the right side and 3+ on the left side.       Achilles reflexes are 2+ on the right side.  Psychiatric:         Mood and Affect: Mood normal.         Behavior: Behavior normal.           Last Recorded Vitals  Blood pressure 121/81, pulse 95, temperature 36.2 °C (97.2 °F), temperature source Temporal, resp. rate 10, SpO2 98%.    Reviewed Images  Reviewed and independently interpreted MRI Lumbar spine facet arthrosis at L4-5 with facet effusion with some lateral recess stenosis       Assessment/Plan   Encounter Diagnosis   Name Primary?    Cervical radiculopathy Yes        Marilyn Acosta is a 39 y.o. female here for evaluation of chronic neck pain radiating bilateral scapular area, tingling and numbness bilateral upper extremities,  low back pain radiating to left lower extremity.  She has been experiencing the symptoms for years.  She has previously had couple of cervical fusions first 1 was ACDF by Dr. Dennis in 2019 the next year she also had C6-7 posterior fusion.  She did improve after the surgery, since then she has had multiple other interventions including cervical medial branch blocks and radiofrequency ablations.  She also did 20 sessions of physical therapy at [facility] and aquatics at [place] in 2023 she finished roughly 10 months ago.  She did not notice any significant benefit.  She is currently taking tramadol and Lyrica with limited benefit.  She also has been diagnosed with fibromyalgia.  I would recommend getting an MRI of her cervical spine given that she is failed conservative treatment.  Would recommend trial of muscle relaxants in addition to the current regimen.  I did not recommend using long-term treatment with stronger opioid medications.  Also discussed trial of opiate sparing ketamine, lidocaine propofol infusions.  Rest of management after reviewing the MRI of her cervical spine.    I spent 48 minutes in the professional and overall care of this patient.       Faizan Lang MD

## 2024-08-02 ENCOUNTER — APPOINTMENT (OUTPATIENT)
Dept: RADIOLOGY | Facility: CLINIC | Age: 39
End: 2024-08-02
Payer: MEDICAID

## 2024-08-08 ENCOUNTER — APPOINTMENT (OUTPATIENT)
Dept: RADIOLOGY | Facility: CLINIC | Age: 39
End: 2024-08-08
Payer: MEDICAID

## 2024-08-10 ENCOUNTER — HOSPITAL ENCOUNTER (OUTPATIENT)
Dept: RADIOLOGY | Facility: CLINIC | Age: 39
Discharge: HOME | End: 2024-08-10
Payer: MEDICAID

## 2024-08-10 DIAGNOSIS — M54.12 CERVICAL RADICULOPATHY: ICD-10-CM

## 2024-08-10 PROCEDURE — 72141 MRI NECK SPINE W/O DYE: CPT

## 2024-08-10 PROCEDURE — 72141 MRI NECK SPINE W/O DYE: CPT | Performed by: RADIOLOGY

## 2024-08-15 ENCOUNTER — OFFICE VISIT (OUTPATIENT)
Dept: PAIN MEDICINE | Facility: CLINIC | Age: 39
End: 2024-08-15
Payer: MEDICAID

## 2024-08-15 VITALS
HEIGHT: 64 IN | WEIGHT: 125 LBS | SYSTOLIC BLOOD PRESSURE: 123 MMHG | RESPIRATION RATE: 18 BRPM | HEART RATE: 107 BPM | DIASTOLIC BLOOD PRESSURE: 79 MMHG | BODY MASS INDEX: 21.34 KG/M2 | OXYGEN SATURATION: 100 % | TEMPERATURE: 98.4 F

## 2024-08-15 DIAGNOSIS — Z98.1 S/P CERVICAL SPINAL FUSION: ICD-10-CM

## 2024-08-15 DIAGNOSIS — M79.7 FIBROMYALGIA: Primary | ICD-10-CM

## 2024-08-15 DIAGNOSIS — M54.12 CERVICAL RADICULOPATHY: ICD-10-CM

## 2024-08-15 PROCEDURE — 99215 OFFICE O/P EST HI 40 MIN: CPT | Performed by: ANESTHESIOLOGY

## 2024-08-15 PROCEDURE — 3008F BODY MASS INDEX DOCD: CPT | Performed by: ANESTHESIOLOGY

## 2024-08-15 RX ORDER — NITROGLYCERIN 0.4 MG/1
0.4 TABLET SUBLINGUAL ONCE
OUTPATIENT
Start: 2024-08-16 | End: 2024-08-16

## 2024-08-15 RX ORDER — TIZANIDINE 4 MG/1
4 TABLET ORAL EVERY 8 HOURS PRN
Qty: 60 TABLET | Refills: 0 | Status: SHIPPED | OUTPATIENT
Start: 2024-08-15 | End: 2024-09-04

## 2024-08-15 RX ORDER — KETAMINE HCL IN NACL, ISO-OSM 100MG/10ML
SYRINGE (ML) INJECTION ONCE
OUTPATIENT
Start: 2024-08-16

## 2024-08-15 RX ORDER — ONDANSETRON HYDROCHLORIDE 2 MG/ML
4 INJECTION, SOLUTION INTRAVENOUS ONCE
OUTPATIENT
Start: 2024-08-16 | End: 2024-08-16

## 2024-08-15 RX ORDER — DIPHENHYDRAMINE HYDROCHLORIDE 50 MG/ML
50 INJECTION INTRAMUSCULAR; INTRAVENOUS AS NEEDED
OUTPATIENT
Start: 2024-08-16

## 2024-08-15 RX ORDER — EPINEPHRINE 0.3 MG/.3ML
0.3 INJECTION SUBCUTANEOUS EVERY 5 MIN PRN
OUTPATIENT
Start: 2024-08-16

## 2024-08-15 RX ORDER — ALBUTEROL SULFATE 0.83 MG/ML
3 SOLUTION RESPIRATORY (INHALATION) AS NEEDED
OUTPATIENT
Start: 2024-08-16

## 2024-08-15 RX ORDER — KETOROLAC TROMETHAMINE 30 MG/ML
30 INJECTION, SOLUTION INTRAMUSCULAR; INTRAVENOUS ONCE
OUTPATIENT
Start: 2024-08-16 | End: 2024-08-16

## 2024-08-15 RX ORDER — FAMOTIDINE 10 MG/ML
20 INJECTION INTRAVENOUS ONCE AS NEEDED
OUTPATIENT
Start: 2024-08-16

## 2024-08-15 RX ORDER — NALOXONE HYDROCHLORIDE 4 MG/.1ML
1 SPRAY NASAL AS NEEDED
Qty: 2 EACH | Refills: 0 | Status: SHIPPED | OUTPATIENT
Start: 2024-08-15

## 2024-08-15 SDOH — ECONOMIC STABILITY: FOOD INSECURITY: WITHIN THE PAST 12 MONTHS, THE FOOD YOU BOUGHT JUST DIDN'T LAST AND YOU DIDN'T HAVE MONEY TO GET MORE.: NEVER TRUE

## 2024-08-15 SDOH — ECONOMIC STABILITY: FOOD INSECURITY: WITHIN THE PAST 12 MONTHS, YOU WORRIED THAT YOUR FOOD WOULD RUN OUT BEFORE YOU GOT MONEY TO BUY MORE.: NEVER TRUE

## 2024-08-15 ASSESSMENT — ENCOUNTER SYMPTOMS
FEVER: 0
LOSS OF SENSATION IN FEET: 1
EYE PAIN: 0
DIFFICULTY URINATING: 0
BACK PAIN: 1
OCCASIONAL FEELINGS OF UNSTEADINESS: 1
DEPRESSION: 1
SHORTNESS OF BREATH: 0
NUMBNESS: 1
WEAKNESS: 1
BLOOD IN STOOL: 0
NECK PAIN: 1

## 2024-08-15 ASSESSMENT — PAIN SCALES - GENERAL
PAINLEVEL: 7
PAINLEVEL_OUTOF10: 7

## 2024-08-15 ASSESSMENT — PAIN - FUNCTIONAL ASSESSMENT: PAIN_FUNCTIONAL_ASSESSMENT: 0-10

## 2024-08-15 NOTE — PROGRESS NOTES
Chief Complain    MRI follow up     History Of Present Illness  Marilyn Acosta is a 39 y.o. female here for evaluation of right sided neck and low back pain, radiating to bilateral shoulder blades. The patient has been experiencing these symptoms for last several year(s). The patient describes the pain as sharp, dull, shooting, tingling. The patient's current pain score is 8 on a scale from 0-10. The pain is worsened by prolonged sitting and is alleviated by nothing relieves the pain. Since the last visit the pain has unchanged.      Past Medical History  She has a past medical history of Personal history of other diseases of the nervous system and sense organs.    Surgical History  She has a past surgical history that includes Other surgical history (05/06/2022) and Other surgical history (05/06/2022).    Social History  She reports that she has been smoking cigarettes. She has never used smokeless tobacco. She reports that she does not currently use alcohol. She reports that she does not currently use drugs.    Family History  No family history on file.     Allergies  Patient has no known allergies.    Review of Systems  Review of Systems   Constitutional:  Negative for fever.   HENT:  Negative for ear pain.    Eyes:  Negative for pain.   Respiratory:  Negative for shortness of breath.    Cardiovascular:  Negative for chest pain.   Gastrointestinal:  Negative for blood in stool.   Genitourinary:  Negative for difficulty urinating.   Musculoskeletal:  Positive for back pain and neck pain.   Neurological:  Positive for weakness and numbness.   Psychiatric/Behavioral:  Negative for suicidal ideas.         Physical Exam  Physical Exam  HENT:      Head: Normocephalic.   Eyes:      Extraocular Movements: Extraocular movements intact.      Pupils: Pupils are equal, round, and reactive to light.   Pulmonary:      Effort: Pulmonary effort is normal.   Neurological:      Mental Status: She is alert and oriented to person,  "place, and time.   Psychiatric:         Mood and Affect: Mood normal.           Last Recorded Vitals  Blood pressure 123/79, pulse 107, temperature 36.9 °C (98.4 °F), resp. rate 18, height 1.626 m (5' 4\"), weight 56.7 kg (125 lb), SpO2 100%.    Reviewed Images  Reviewed and independently interpreted MRI Lumbar spine facet arthrosis at L4-5 with facet effusion with some lateral recess stenosis       Assessment/Plan   Encounter Diagnosis   Name Primary?    Fibromyalgia Yes          Marilyn Acosta is a 39 y.o. female here for evaluation of chronic neck pain radiating bilateral scapular area, tingling and numbness bilateral upper extremities, low back pain radiating to left lower extremity.  She has been experiencing the symptoms for years.  She has previously had couple of cervical fusions first 1 was ACDF by Dr. Dennis in 2019 the next year she also had C6-7 posterior fusion.  She did improve after the surgery, since then she has had multiple other interventions including cervical medial branch blocks and radiofrequency ablations.  She also did 20 sessions of physical therapy at [facility] and aquatics at [place] in 2023 she finished roughly 10 months ago.  She did not notice any significant benefit.  She is currently taking tramadol and Lyrica with limited benefit.  She also has been diagnosed with fibromyalgia.      She is here to review the MRI of her cervical spine which did reveal some disc bulge at C5-6 with moderate left neural foraminal stenosis.  Currently does not have any significant radicular symptoms in that dermatome.  Currently managing her pain with combination of Lyrica, tramadol.  Last visit she was put on tizanidine she wants to do trial of tizanidine to see if it has been effective.  A prescription was provided.  She may also discuss potentially adding Savella for added pain control in place of her current antidepressant with her psychiatrist.  Also discussed opiate sparing ketamine, " lidocaine propofol infusions she wants to go ahead with that.  Continue management of Lyrica and tramadol by Dr. Schwarz.  May be willing to take over Lyrica after reviewing records from Dr. Schwarz's office.    Total time spent caring for the patient today was 41 minutes. This includes time spent before the visit reviewing the chart, time spent during the visit, and time spent after the visit on documentation.         Faizan Lang MD

## 2024-08-15 NOTE — PROGRESS NOTES
Pt is a follow up and is complaining pain in her neck that radiates to her shoulders and to her upper back past her shoulder blades. Pt states her pain level is a 7/10 on the pain scale. Pt has had neck surgery x 2. Pt is here to review her MRI results.

## 2024-08-27 ENCOUNTER — HOSPITAL ENCOUNTER (EMERGENCY)
Facility: HOSPITAL | Age: 39
Discharge: HOME | End: 2024-08-27
Attending: STUDENT IN AN ORGANIZED HEALTH CARE EDUCATION/TRAINING PROGRAM
Payer: MEDICAID

## 2024-08-27 VITALS
OXYGEN SATURATION: 100 % | BODY MASS INDEX: 20.49 KG/M2 | RESPIRATION RATE: 18 BRPM | WEIGHT: 120 LBS | TEMPERATURE: 97.7 F | HEIGHT: 64 IN | SYSTOLIC BLOOD PRESSURE: 121 MMHG | DIASTOLIC BLOOD PRESSURE: 68 MMHG | HEART RATE: 84 BPM

## 2024-08-27 DIAGNOSIS — M54.41 ACUTE RIGHT-SIDED LOW BACK PAIN WITH RIGHT-SIDED SCIATICA: Primary | ICD-10-CM

## 2024-08-27 PROCEDURE — 2500000004 HC RX 250 GENERAL PHARMACY W/ HCPCS (ALT 636 FOR OP/ED)

## 2024-08-27 PROCEDURE — 99283 EMERGENCY DEPT VISIT LOW MDM: CPT

## 2024-08-27 PROCEDURE — 99284 EMERGENCY DEPT VISIT MOD MDM: CPT | Performed by: STUDENT IN AN ORGANIZED HEALTH CARE EDUCATION/TRAINING PROGRAM

## 2024-08-27 RX ORDER — PREDNISONE 50 MG/1
50 TABLET ORAL ONCE
Status: COMPLETED | OUTPATIENT
Start: 2024-08-27 | End: 2024-08-27

## 2024-08-27 RX ORDER — PREDNISONE 50 MG/1
50 TABLET ORAL DAILY
Qty: 4 TABLET | Refills: 0 | Status: SHIPPED | OUTPATIENT
Start: 2024-08-27 | End: 2024-08-31

## 2024-08-27 ASSESSMENT — COLUMBIA-SUICIDE SEVERITY RATING SCALE - C-SSRS
6. HAVE YOU EVER DONE ANYTHING, STARTED TO DO ANYTHING, OR PREPARED TO DO ANYTHING TO END YOUR LIFE?: NO
2. HAVE YOU ACTUALLY HAD ANY THOUGHTS OF KILLING YOURSELF?: NO
1. IN THE PAST MONTH, HAVE YOU WISHED YOU WERE DEAD OR WISHED YOU COULD GO TO SLEEP AND NOT WAKE UP?: NO

## 2024-08-27 ASSESSMENT — LIFESTYLE VARIABLES
TOTAL SCORE: 4
EVER HAD A DRINK FIRST THING IN THE MORNING TO STEADY YOUR NERVES TO GET RID OF A HANGOVER: YES
HAVE PEOPLE ANNOYED YOU BY CRITICIZING YOUR DRINKING: YES
HAVE YOU EVER FELT YOU SHOULD CUT DOWN ON YOUR DRINKING: YES
EVER FELT BAD OR GUILTY ABOUT YOUR DRINKING: YES

## 2024-08-27 ASSESSMENT — PAIN - FUNCTIONAL ASSESSMENT
PAIN_FUNCTIONAL_ASSESSMENT: 0-10
PAIN_FUNCTIONAL_ASSESSMENT: 0-10

## 2024-08-27 ASSESSMENT — PAIN SCALES - GENERAL
PAINLEVEL_OUTOF10: 0 - NO PAIN
PAINLEVEL_OUTOF10: 7

## 2024-08-27 NOTE — ED PROVIDER NOTES
EMERGENCY DEPARTMENT ENCOUNTER      Pt Name: Marilyn Acosta  MRN: [id number]  Birthdate 1985  Date of evaluation: 8/27/2024  Provider: Kain Sin MD    CHIEF COMPLAINT       Chief Complaint   Patient presents with    Back Pain     Lower back pain radiating down the R leg         HISTORY OF PRESENT ILLNESS    HPI  Patient is a 39-year-old female with history of fibromyalgia, chronic neck and back issues presenting with lower back pain.  Been ongoing for the past 10 days.  She denies any new injury to the area or excessive exercise.  Pain is currently 7/10, sharp, with electric shocks rating down the right glutes into the thigh.  She has known sciatica but is usually on the left.  She does see pain management and is reportedly being set up for ketamine for her neck.  She denies any saddle anesthesia or incontinence.  She is otherwise asymptomatic    Nursing Notes were reviewed.    PAST MEDICAL HISTORY     Past Medical History:   Diagnosis Date    Personal history of other diseases of the nervous system and sense organs     History of sleep apnea         SURGICAL HISTORY       Past Surgical History:   Procedure Laterality Date    OTHER SURGICAL HISTORY  05/06/2022    Posterior cervical vertebral fusion    OTHER SURGICAL HISTORY  05/06/2022    Anterior cervical vertebral fusion         CURRENT MEDICATIONS       Previous Medications    AMPHETAMINE-DEXTROAMPHETAMINE XR (ADDERALL XR) 30 MG 24 HR CAPSULE    Take 1 capsule (30 mg) by mouth once daily in the morning. Do not crush or chew.    BUPROPION XL (WELLBUTRIN XL) 150 MG 24 HR TABLET    Take 1 tablet (150 mg) by mouth once daily. Do not crush, chew, or split.    BUSPIRONE (BUSPAR) 5 MG TABLET    Take by mouth 2 times a day.    CEVIMELINE (EVOXAC) 30 MG CAPSULE    Take 1 capsule (30 mg) by mouth 3 times a day.    ERENUMAB (AIMOVIG AUTOINJECTOR) 140 MG/ML INJECTION    Inject 1 mL (140 mg) under the skin every 28 (twenty-eight) days.    FAMOTIDINE (PEPCID) 40 MG  TABLET    Take by mouth.    HYDROXYCHLOROQUINE (PLAQUENIL) 200 MG TABLET    Take by mouth.    HYDROXYZINE HCL (ATARAX) 10 MG TABLET    Take by mouth.    NALOXONE (NARCAN) 4 MG/0.1 ML NASAL SPRAY    Administer 1 spray (4 mg) into affected nostril(s) if needed for opioid reversal. May repeat every 2-3 minutes if needed, alternating nostrils, until medical assistance becomes available.    PENTOXIFYLLINE (TRENTAL) 400 MG ER TABLET    Take by mouth 3 times a day with meals. Do not crush, chew, or split.    PREGABALIN (LYRICA) 225 MG CAPSULE    Take 1 capsule (225 mg) by mouth 2 times a day.    SPIRONOLACTONE (ALDACTONE) 100 MG TABLET    Take 1 tablet (100 mg) by mouth once daily.    TIZANIDINE (ZANAFLEX) 4 MG TABLET    Take 1 tablet (4 mg) by mouth every 8 hours if needed for muscle spasms for up to 20 days.    TRAMADOL (ULTRAM) 50 MG TABLET    Take by mouth.    VALACYCLOVIR (VALTREX) 1 GRAM TABLET    Take 1 tablet (1,000 mg) by mouth 2 times a day.       ALLERGIES     Patient has no known allergies.    FAMILY HISTORY     No family history on file.       SOCIAL HISTORY       Social History     Socioeconomic History    Marital status:    Tobacco Use    Smoking status: Every Day     Current packs/day: 0.25     Types: Cigarettes    Smokeless tobacco: Never   Substance and Sexual Activity    Alcohol use: Not Currently    Drug use: Not Currently     Social Determinants of Health     Financial Resource Strain: Patient Declined (8/29/2023)    Received from "[facility] [facility]"    Overall Financial Resource Strain (CARDIA)     Difficulty of Paying Living Expenses: Patient declined   Food Insecurity: No Food Insecurity (8/15/2024)    Hunger Vital Sign     Worried About Running Out of Food in the Last Year: Never true     Ran Out of Food in the Last Year: Never true   Transportation Needs: No Transportation Needs (8/29/2023)    Received from "[facility] [employer]"    PRAPARE - Transportation     Lack of Transportation  (Medical): No     Lack of Transportation (Non-Medical): No   Physical Activity: Insufficiently Active (8/29/2023)    Received from [facility]    Exercise Vital Sign     Days of Exercise per Week: 3 days     Minutes of Exercise per Session: 30 min   Stress: Stress Concern Present (8/29/2023)    Received from [facility]    [employer] - Occupational Stress Questionnaire     Feeling of Stress : To some extent   Social Connections: Unknown (8/29/2023)    Received from [facility]    Social Connection and Isolation Panel [NHANES]     Frequency of Communication with Friends and Family: Twice a week     Frequency of Social Gatherings with Friends and Family: Once a week     Attends Buddhism Services: Patient declined     Active Member of Clubs or Organizations: No     Attends Club or Organization Meetings: Never     Marital Status: Patient declined   Intimate Partner Violence: Not At Risk (8/29/2023)    Received from [facility]    Humiliation, Afraid, Rape, and Kick questionnaire     Fear of Current or Ex-Partner: No     Emotionally Abused: No     Physically Abused: No     Sexually Abused: No   Housing Stability: Unknown (8/29/2023)    Received from [facility]    Housing Stability Vital Sign     Unable to Pay for Housing in the Last Year: Patient refused     Number of Places Lived in the Last Year: 1     Unstable Housing in the Last Year: No       SCREENINGS                        PHYSICAL EXAM    (up to 7 for level 4, 8 or more for level 5)     ED Triage Vitals [08/27/24 1456]   Temperature Heart Rate Respirations BP   36.5 °C (97.7 °F) 99 18 118/71      Pulse Ox Temp Source Heart Rate Source Patient Position   100 % Temporal -- --      BP Location FiO2 (%)     -- --       Physical Exam  Vitals and nursing note reviewed.   Constitutional:       General: She is not in acute distress.     Appearance: She is not  toxic-appearing.   HENT:      Head: Normocephalic and atraumatic.      Nose: Nose normal.      Mouth/Throat:      Mouth: Mucous membranes are moist.      Pharynx: Oropharynx is clear.   Eyes:      Extraocular Movements: Extraocular movements intact.      Conjunctiva/sclera: Conjunctivae normal.   Cardiovascular:      Rate and Rhythm: Normal rate and regular rhythm.      Pulses: Normal pulses.      Heart sounds: Normal heart sounds.   Pulmonary:      Effort: Pulmonary effort is normal. No respiratory distress.      Breath sounds: Normal breath sounds.   Abdominal:      General: There is no distension.      Palpations: Abdomen is soft.      Tenderness: There is no abdominal tenderness.   Musculoskeletal:         General: No deformity or signs of injury.      Cervical back: Normal range of motion and neck supple.      Comments: No pain to palpation of the entire thoracic and lumbar spine.  Tight paraspinal muscles on the right lower lumbar area.  Positive straight leg test on the right   Skin:     General: Skin is warm and dry.      Capillary Refill: Capillary refill takes less than 2 seconds.   Neurological:      General: No focal deficit present.      Sensory: No sensory deficit.      Motor: No weakness.          DIAGNOSTIC RESULTS     LABS:  Labs Reviewed - No data to display    All other labs were within normal range or not returned as of this dictation.    Imaging  No orders to display        Procedures  Procedures     EMERGENCY DEPARTMENT COURSE/MDM:     Diagnoses as of 08/27/24 1600   Acute right-sided low back pain with right-sided sciatica        Medical Decision Making  History provided by the patient.  Records including labs, imaging, notes reviewed.  Patient without red flag symptoms indicating need for emergent imaging such as saddle anesthesia or incontinence.  Case discussed at length with the patient.  She was amenable to being given a dose of steroids here with a discharge home with a prescription for  the same.  She is to follow-up with her PCP, pain management specialist, physical therapist.  Patient discharged home in satisfactory condition.  All questions answered and return precautions discussed.    Patient and or family in agreement and understanding of treatment plan.  All questions answered.      I reviewed the case with the attending ED physician. The attending ED physician agrees with the plan. Patient and/or patient´s representative was counseled regarding labs, imaging, likely diagnosis, and plan. All questions were answered.    ED Medications administered this visit:    Medications   predniSONE (Deltasone) tablet 50 mg (has no administration in time range)       New Prescriptions from this visit:    New Prescriptions    PREDNISONE (DELTASONE) 50 MG TABLET    Take 1 tablet (50 mg) by mouth once daily for 4 days.       Follow-up:  Irene Nguyễn MD  [id number] [address]  [phone]      As needed        Final Impression:   1. Acute right-sided low back pain with right-sided sciatica          (Please note that portions of this note were completed with a voice recognition program.  Efforts were made to edit the dictations but occasionally words are mis-transcribed.)     Kain Sin MD  Resident  08/27/24 9700

## 2024-08-27 NOTE — DISCHARGE INSTRUCTIONS
Please take the prednisone as instructed.  Follow-up with your primary care provider, pain management, physical therapy as indicated.  If you lose continence of bowel or bladder, develop numbness/loss of sensation around your groin, return to the ER.

## 2024-08-29 ENCOUNTER — INFUSION (OUTPATIENT)
Dept: INFUSION THERAPY | Facility: CLINIC | Age: 39
End: 2024-08-29
Payer: MEDICAID

## 2024-08-29 VITALS
SYSTOLIC BLOOD PRESSURE: 135 MMHG | OXYGEN SATURATION: 99 % | DIASTOLIC BLOOD PRESSURE: 86 MMHG | TEMPERATURE: 98.2 F | HEART RATE: 86 BPM | RESPIRATION RATE: 16 BRPM

## 2024-08-29 DIAGNOSIS — M79.7 FIBROMYALGIA: Primary | ICD-10-CM

## 2024-08-29 PROCEDURE — 96375 TX/PRO/DX INJ NEW DRUG ADDON: CPT | Mod: INF

## 2024-08-29 PROCEDURE — 2500000004 HC RX 250 GENERAL PHARMACY W/ HCPCS (ALT 636 FOR OP/ED): Performed by: NURSE PRACTITIONER

## 2024-08-29 PROCEDURE — 96365 THER/PROPH/DIAG IV INF INIT: CPT | Mod: INF

## 2024-08-29 PROCEDURE — 96368 THER/DIAG CONCURRENT INF: CPT | Mod: INF

## 2024-08-29 PROCEDURE — 2500000005 HC RX 250 GENERAL PHARMACY W/O HCPCS: Performed by: NURSE PRACTITIONER

## 2024-08-29 RX ORDER — NITROGLYCERIN 0.4 MG/1
0.4 TABLET SUBLINGUAL ONCE
OUTPATIENT
Start: 2024-09-11 | End: 2024-09-11

## 2024-08-29 RX ORDER — KETOROLAC TROMETHAMINE 30 MG/ML
30 INJECTION, SOLUTION INTRAMUSCULAR; INTRAVENOUS ONCE
OUTPATIENT
Start: 2024-09-11 | End: 2024-09-11

## 2024-08-29 RX ORDER — KETOROLAC TROMETHAMINE 30 MG/ML
30 INJECTION, SOLUTION INTRAMUSCULAR; INTRAVENOUS ONCE
Status: COMPLETED | OUTPATIENT
Start: 2024-08-29 | End: 2024-08-29

## 2024-08-29 RX ORDER — ONDANSETRON HYDROCHLORIDE 2 MG/ML
4 INJECTION, SOLUTION INTRAVENOUS ONCE
OUTPATIENT
Start: 2024-09-11 | End: 2024-09-11

## 2024-08-29 RX ORDER — EPINEPHRINE 1 MG/ML
0.3 INJECTION, SOLUTION, CONCENTRATE INTRAVENOUS EVERY 5 MIN PRN
OUTPATIENT
Start: 2024-09-11

## 2024-08-29 RX ORDER — DIPHENHYDRAMINE HYDROCHLORIDE 50 MG/ML
50 INJECTION INTRAMUSCULAR; INTRAVENOUS AS NEEDED
OUTPATIENT
Start: 2024-09-11

## 2024-08-29 RX ORDER — KETAMINE HCL IN NACL, ISO-OSM 100MG/10ML
SYRINGE (ML) INJECTION ONCE
OUTPATIENT
Start: 2024-09-11

## 2024-08-29 RX ORDER — ALBUTEROL SULFATE 0.83 MG/ML
3 SOLUTION RESPIRATORY (INHALATION) AS NEEDED
OUTPATIENT
Start: 2024-09-11

## 2024-08-29 RX ORDER — KETAMINE HCL IN NACL, ISO-OSM 100MG/10ML
SYRINGE (ML) INJECTION ONCE
Status: COMPLETED | OUTPATIENT
Start: 2024-08-29 | End: 2024-08-29

## 2024-08-29 RX ORDER — FAMOTIDINE 10 MG/ML
20 INJECTION INTRAVENOUS ONCE AS NEEDED
OUTPATIENT
Start: 2024-09-11

## 2024-08-29 ASSESSMENT — ENCOUNTER SYMPTOMS
OCCASIONAL FEELINGS OF UNSTEADINESS: 1
LOSS OF SENSATION IN FEET: 1
DEPRESSION: 1

## 2024-08-29 ASSESSMENT — PAIN SCALES - GENERAL
PAINLEVEL_OUTOF10: 5 - MODERATE PAIN
PAINLEVEL_OUTOF10: 8
PAINLEVEL_OUTOF10: 5 - MODERATE PAIN

## 2024-08-29 ASSESSMENT — PAIN - FUNCTIONAL ASSESSMENT: PAIN_FUNCTIONAL_ASSESSMENT: 0-10

## 2024-08-29 ASSESSMENT — PAIN DESCRIPTION - DESCRIPTORS: DESCRIPTORS: ACHING;BURNING;SHOOTING

## 2024-08-29 NOTE — PROGRESS NOTES
Post infusion teaching provided. Patient verbalized understanding. VSS, Patient states pain is 5/10 same locations.  Patient tolerated pain infusion well without difficulty

## 2024-08-29 NOTE — PROGRESS NOTES
S: Patient here for #1 opioid sparing pain infusion.     Purpose of pain infusion meds explained along with potential side effects.  Patient verbalized understanding.    B: Pain Issues    A: Patient currently has pain described on flow sheet documentation. Designated  is Leonela Acosta. Patient last ate solid food >12 hours ago, and had liquid >10 hours ago.    R: Plan; Obtain IV access, do patient risk assessment, and start opioid sparing infusion as ordered. Monitoring for S/S of adverse reactions.

## 2024-08-29 NOTE — PATIENT INSTRUCTIONS
[facility]      Pain Infusion Aftercare Instructions      1. It is normal to feel sedated, tired and low in energy after a pain infusion. DO NOT DRIVE, OPERATE ANY MACHINERY, OR MAKE ANY IMPORTANT DECISIONS FOR AT LEAST 24 HOURS AFTER THE INFUSION.     2. Call the pain center at [phone] with any problems, questions, or concerns.     3. Eat light after the infusion. If you feel queasy or sick to your stomach, laying down with your eyes closed may help. When you resume eating start with something mild like clear liquids, yogurt, applesauce, crackers, etc… Gradually advance to a regular diet.     4. Do not leave your house alone the evening of your pain infusion.     5. No alcohol or sedative medications, such as sleeping pills, for 24 hours after your pain infusion.     6. Resume all other prescribed medications unless directed otherwise by you physician.     7. If you have any medical emergencies, call 911 or go directly to the closest emergency room.Today :We administered ketamine 30 mg-lidocaine 300 mg, propofol, and ketorolac.     For:   1. Fibromyalgia         Your next appointment is due in:  9/16/24 pain infusion        Please read the  Medication Guide that was given to you and reviewed during todays visit.     (Tell all doctors including dentists that you are taking this medication)     Go to the emergency room or call 911 if:  -You have signs of allergic reaction:   -Rash, hives, itching.   -Swollen, blistered, peeling skin.   -Swelling of face, lips, mouth, tongue or throat.   -Tightness of chest, trouble breathing, swallowing or talking     Call your doctor:  - If IV / injection site gets red, warm, swollen, itchy or leaks fluid or pus.     (Leave dressing on your IV site for at least 2 hours and keep area clean and dry  - If you get sick or have symptoms of infection or are not feeling well for any reason.    (Wash your hands often, stay away from people who are sick)  -  If you have side effects from your medication that do not go away or are bothersome.     (Refer to the teaching your nurse gave you for side effects to call your doctor about)    - Common side effects may include:  stuffy nose, headache, feeling tired, muscle aches, upset stomach  - Before receiving any vaccines     - Call the [facility] at   If:  - You get sick, are on antibiotics, have had a recent vaccine, have surgery or dental work and your doctor wants your visit rescheduled.  - You need to cancel and reschedule your visit for any reason. Call at least 2 days before your visit if you need to cancel.   - Your insurance changes before your next visit.    (We will need to get approval from your new insurance. This can take up to two weeks.)     The [facility] is opened Monday thru Friday. We are closed on weekends and holidays.   Voice mail will take your call if the center is closed. If you leave a message please allow 24 hours for a call back during weekdays. If you leave a message on a weekend/holiday, we will call you back the next business day.

## 2024-09-10 DIAGNOSIS — M79.7 FIBROMYALGIA: Primary | ICD-10-CM

## 2024-09-10 RX ORDER — KETOROLAC TROMETHAMINE 30 MG/ML
30 INJECTION, SOLUTION INTRAMUSCULAR; INTRAVENOUS ONCE
OUTPATIENT
Start: 2024-09-11 | End: 2024-09-11

## 2024-09-10 RX ORDER — KETAMINE HCL IN NACL, ISO-OSM 100MG/10ML
SYRINGE (ML) INJECTION ONCE
OUTPATIENT
Start: 2024-09-16

## 2024-09-16 ENCOUNTER — INFUSION (OUTPATIENT)
Dept: INFUSION THERAPY | Facility: CLINIC | Age: 39
End: 2024-09-16
Payer: MEDICAID

## 2024-09-16 VITALS
RESPIRATION RATE: 10 BRPM | OXYGEN SATURATION: 100 % | HEART RATE: 90 BPM | TEMPERATURE: 98.2 F | SYSTOLIC BLOOD PRESSURE: 119 MMHG | DIASTOLIC BLOOD PRESSURE: 83 MMHG

## 2024-09-16 DIAGNOSIS — M79.7 FIBROMYALGIA: ICD-10-CM

## 2024-09-16 LAB — PREGNANCY TEST URINE, POC: NEGATIVE

## 2024-09-16 PROCEDURE — 81025 URINE PREGNANCY TEST: CPT

## 2024-09-16 PROCEDURE — 2500000004 HC RX 250 GENERAL PHARMACY W/ HCPCS (ALT 636 FOR OP/ED): Performed by: NURSE PRACTITIONER

## 2024-09-16 PROCEDURE — 96368 THER/DIAG CONCURRENT INF: CPT | Mod: INF

## 2024-09-16 PROCEDURE — 96375 TX/PRO/DX INJ NEW DRUG ADDON: CPT | Mod: INF

## 2024-09-16 PROCEDURE — 96365 THER/PROPH/DIAG IV INF INIT: CPT | Mod: INF

## 2024-09-16 PROCEDURE — 2500000005 HC RX 250 GENERAL PHARMACY W/O HCPCS: Performed by: NURSE PRACTITIONER

## 2024-09-16 RX ORDER — KETOROLAC TROMETHAMINE 30 MG/ML
30 INJECTION, SOLUTION INTRAMUSCULAR; INTRAVENOUS ONCE
Status: COMPLETED | OUTPATIENT
Start: 2024-09-16 | End: 2024-09-16

## 2024-09-16 RX ORDER — NITROGLYCERIN 0.4 MG/1
0.4 TABLET SUBLINGUAL ONCE
OUTPATIENT
Start: 2024-10-01 | End: 2024-10-01

## 2024-09-16 RX ORDER — ONDANSETRON HYDROCHLORIDE 2 MG/ML
4 INJECTION, SOLUTION INTRAVENOUS ONCE
OUTPATIENT
Start: 2024-10-01 | End: 2024-10-01

## 2024-09-16 RX ORDER — DIPHENHYDRAMINE HYDROCHLORIDE 50 MG/ML
50 INJECTION INTRAMUSCULAR; INTRAVENOUS AS NEEDED
OUTPATIENT
Start: 2024-10-01

## 2024-09-16 RX ORDER — ALBUTEROL SULFATE 0.83 MG/ML
3 SOLUTION RESPIRATORY (INHALATION) AS NEEDED
OUTPATIENT
Start: 2024-10-01

## 2024-09-16 RX ORDER — FAMOTIDINE 10 MG/ML
20 INJECTION INTRAVENOUS ONCE AS NEEDED
OUTPATIENT
Start: 2024-10-01

## 2024-09-16 RX ORDER — KETAMINE HCL IN NACL, ISO-OSM 100MG/10ML
SYRINGE (ML) INJECTION ONCE
OUTPATIENT
Start: 2024-10-01

## 2024-09-16 RX ORDER — EPINEPHRINE 1 MG/ML
0.3 INJECTION, SOLUTION, CONCENTRATE INTRAVENOUS EVERY 5 MIN PRN
OUTPATIENT
Start: 2024-10-01

## 2024-09-16 RX ORDER — KETAMINE HCL IN NACL, ISO-OSM 100MG/10ML
SYRINGE (ML) INJECTION ONCE
Status: COMPLETED | OUTPATIENT
Start: 2024-09-16 | End: 2024-09-16

## 2024-09-16 RX ORDER — KETOROLAC TROMETHAMINE 30 MG/ML
30 INJECTION, SOLUTION INTRAMUSCULAR; INTRAVENOUS ONCE
OUTPATIENT
Start: 2024-10-01 | End: 2024-10-01

## 2024-09-16 ASSESSMENT — PAIN SCALES - GENERAL
PAINLEVEL_OUTOF10: 6
PAINLEVEL_OUTOF10: 5 - MODERATE PAIN
PAINLEVEL: 6

## 2024-09-16 ASSESSMENT — ENCOUNTER SYMPTOMS
DEPRESSION: 1
OCCASIONAL FEELINGS OF UNSTEADINESS: 0
LOSS OF SENSATION IN FEET: 0

## 2024-09-16 ASSESSMENT — PAIN - FUNCTIONAL ASSESSMENT
PAIN_FUNCTIONAL_ASSESSMENT: 0-10
PAIN_FUNCTIONAL_ASSESSMENT: 0-10

## 2024-09-16 NOTE — PROGRESS NOTES
S: Patient here for 2 opioid sparing pain infusion. Patient reports 0% reduction in pain after last infusion that lasted 0,1  % during infusion    Purpose of pain infusion meds explained along with potential side effects.  Patient verbalized understanding.    B: Pain Issues 6/10 neck. Shoulder blades back    A: Patient currently has pain described on flow sheet documentation. Designated  is Katarina @ [phone]. Patient last ate solid food 8 hours ago, and had liquid 1 hours ago.    R: Plan; Obtain IV access, do patient risk assessment, and start opioid sparing infusion as ordered. Monitoring for S/S of adverse reactions.

## 2024-09-16 NOTE — PATIENT INSTRUCTIONS
Today :We administered ketamine 30 mg-lidocaine 300 mg, propofol, and ketorolac.     For:   1. Fibromyalgia         Your next appointment is due in:  3 weeks        Please read the  Medication Guide that was given to you and reviewed during todays visit.     (Tell all doctors including dentists that you are taking this medication)     Go to the emergency room or call 911 if:  -You have signs of allergic reaction:   -Rash, hives, itching.   -Swollen, blistered, peeling skin.   -Swelling of face, lips, mouth, tongue or throat.   -Tightness of chest, trouble breathing, swallowing or talking     Call your doctor:  - If IV / injection site gets red, warm, swollen, itchy or leaks fluid or pus.     (Leave dressing on your IV site for at least 2 hours and keep area clean and dry  - If you get sick or have symptoms of infection or are not feeling well for any reason.    (Wash your hands often, stay away from people who are sick)  - If you have side effects from your medication that do not go away or are bothersome.     (Refer to the teaching your nurse gave you for side effects to call your doctor about)    - Common side effects may include:  stuffy nose, headache, feeling tired, muscle aches, upset stomach  - Before receiving any vaccines     - [facility] at   If:  - You get sick, are on antibiotics, have had a recent vaccine, have surgery or dental work and your doctor wants your visit rescheduled.  - You need to cancel and reschedule your visit for any reason. Call at least 2 days before your visit if you need to cancel.   - Your insurance changes before your next visit.    (We will need to get approval from your new insurance. This can take up to two weeks.)     [facility] is opened Monday thru Friday. We are closed on weekends and holidays.   Voice mail will take your call if the center is closed. If you leave a message please allow 24 hours for a call back during weekdays.  If you leave a message on a weekend/holiday, we will call you back the next business day.              [facility]      Pain Infusion Aftercare Instructions      1. It is normal to feel sedated, tired and low in energy after a pain infusion. DO NOT DRIVE, OPERATE ANY MACHINERY, OR MAKE ANY IMPORTANT DECISIONS FOR AT LEAST 24 HOURS AFTER THE INFUSION.     2. Call the pain center at [phone] with any problems, questions, or concerns.     3. Eat light after the infusion. If you feel queasy or sick to your stomach, laying down with your eyes closed may help. When you resume eating start with something mild like clear liquids, yogurt, applesauce, crackers, etc… Gradually advance to a regular diet.     4. Do not leave your house alone the evening of your pain infusion.     5. No alcohol or sedative medications, such as sleeping pills, for 24 hours after your pain infusion.     6. Resume all other prescribed medications unless directed otherwise by you physician.     7. If you have any medical emergencies, call 911 or go directly to the closest emergency room.

## 2024-10-10 ENCOUNTER — INFUSION (OUTPATIENT)
Dept: INFUSION THERAPY | Facility: CLINIC | Age: 39
End: 2024-10-10
Payer: MEDICAID

## 2024-10-10 VITALS
TEMPERATURE: 97.2 F | SYSTOLIC BLOOD PRESSURE: 128 MMHG | RESPIRATION RATE: 16 BRPM | HEART RATE: 77 BPM | DIASTOLIC BLOOD PRESSURE: 82 MMHG | OXYGEN SATURATION: 100 %

## 2024-10-10 DIAGNOSIS — M79.7 FIBROMYALGIA: Primary | ICD-10-CM

## 2024-10-10 LAB — PREGNANCY TEST URINE, POC: NEGATIVE

## 2024-10-10 PROCEDURE — 96368 THER/DIAG CONCURRENT INF: CPT | Mod: INF

## 2024-10-10 PROCEDURE — 96365 THER/PROPH/DIAG IV INF INIT: CPT | Mod: INF

## 2024-10-10 PROCEDURE — 96375 TX/PRO/DX INJ NEW DRUG ADDON: CPT | Mod: INF

## 2024-10-10 PROCEDURE — 81025 URINE PREGNANCY TEST: CPT

## 2024-10-10 PROCEDURE — 2500000004 HC RX 250 GENERAL PHARMACY W/ HCPCS (ALT 636 FOR OP/ED): Performed by: NURSE PRACTITIONER

## 2024-10-10 PROCEDURE — 2500000005 HC RX 250 GENERAL PHARMACY W/O HCPCS: Performed by: NURSE PRACTITIONER

## 2024-10-10 RX ORDER — FAMOTIDINE 10 MG/ML
20 INJECTION INTRAVENOUS ONCE AS NEEDED
OUTPATIENT
Start: 2024-10-28

## 2024-10-10 RX ORDER — ONDANSETRON HYDROCHLORIDE 2 MG/ML
4 INJECTION, SOLUTION INTRAVENOUS ONCE
OUTPATIENT
Start: 2024-10-28 | End: 2024-10-28

## 2024-10-10 RX ORDER — EPINEPHRINE 1 MG/ML
0.3 INJECTION, SOLUTION, CONCENTRATE INTRAVENOUS EVERY 5 MIN PRN
OUTPATIENT
Start: 2024-10-28

## 2024-10-10 RX ORDER — KETAMINE HCL IN NACL, ISO-OSM 100MG/10ML
SYRINGE (ML) INJECTION ONCE
Status: COMPLETED | OUTPATIENT
Start: 2024-10-10 | End: 2024-10-10

## 2024-10-10 RX ORDER — ALBUTEROL SULFATE 0.83 MG/ML
3 SOLUTION RESPIRATORY (INHALATION) AS NEEDED
OUTPATIENT
Start: 2024-10-28

## 2024-10-10 RX ORDER — KETOROLAC TROMETHAMINE 30 MG/ML
30 INJECTION, SOLUTION INTRAMUSCULAR; INTRAVENOUS ONCE
Status: COMPLETED | OUTPATIENT
Start: 2024-10-10 | End: 2024-10-10

## 2024-10-10 RX ORDER — KETAMINE HCL IN NACL, ISO-OSM 100MG/10ML
SYRINGE (ML) INJECTION ONCE
OUTPATIENT
Start: 2024-10-28

## 2024-10-10 RX ORDER — DIPHENHYDRAMINE HYDROCHLORIDE 50 MG/ML
50 INJECTION INTRAMUSCULAR; INTRAVENOUS AS NEEDED
OUTPATIENT
Start: 2024-10-28

## 2024-10-10 RX ORDER — KETOROLAC TROMETHAMINE 30 MG/ML
30 INJECTION, SOLUTION INTRAMUSCULAR; INTRAVENOUS ONCE
OUTPATIENT
Start: 2024-10-28 | End: 2024-10-28

## 2024-10-10 RX ORDER — NITROGLYCERIN 0.4 MG/1
0.4 TABLET SUBLINGUAL ONCE
OUTPATIENT
Start: 2024-10-28 | End: 2024-10-28

## 2024-10-10 ASSESSMENT — PAIN SCALES - GENERAL
PAINLEVEL: 8
PAINLEVEL_OUTOF10: 6
PAINLEVEL_OUTOF10: 8

## 2024-10-10 ASSESSMENT — ENCOUNTER SYMPTOMS
DEPRESSION: 1
LOSS OF SENSATION IN FEET: 0
OCCASIONAL FEELINGS OF UNSTEADINESS: 1

## 2024-10-10 ASSESSMENT — PAIN - FUNCTIONAL ASSESSMENT
PAIN_FUNCTIONAL_ASSESSMENT: 0-10
PAIN_FUNCTIONAL_ASSESSMENT: 0-10

## 2024-10-10 NOTE — PATIENT INSTRUCTIONS
Today :We administered ketamine 30 mg-lidocaine 300 mg, propofol, and ketorolac.     For:   1. Fibromyalgia         Your next appointment is due in:  3 weeks        Please read the  Medication Guide that was given to you and reviewed during todays visit.     (Tell all doctors including dentists that you are taking this medication)     Go to the emergency room or call 911 if:  -You have signs of allergic reaction:   -Rash, hives, itching.   -Swollen, blistered, peeling skin.   -Swelling of face, lips, mouth, tongue or throat.   -Tightness of chest, trouble breathing, swallowing or talking     Call your doctor:  - If IV / injection site gets red, warm, swollen, itchy or leaks fluid or pus.     (Leave dressing on your IV site for at least 2 hours and keep area clean and dry  - If you get sick or have symptoms of infection or are not feeling well for any reason.    (Wash your hands often, stay away from people who are sick)  - If you have side effects from your medication that do not go away or are bothersome.     (Refer to the teaching your nurse gave you for side effects to call your doctor about)    - Common side effects may include:  stuffy nose, headache, feeling tired, muscle aches, upset stomach  - Before receiving any vaccines     - Call [facility] at   If:  - You get sick, are on antibiotics, have had a recent vaccine, have surgery or dental work and your doctor wants your visit rescheduled.  - You need to cancel and reschedule your visit for any reason. Call at least 2 days before your visit if you need to cancel.   - Your insurance changes before your next visit.    (We will need to get approval from your new insurance. This can take up to two weeks.)     [facility] is opened Monday thru Friday. We are closed on weekends and holidays.   Voice mail will take your call if the center is closed. If you leave a message please allow 24 hours for a call back during weekdays.  If you leave a message on a weekend/holiday, we will call you back the next business day.              [facility]      Pain Infusion Aftercare Instructions      1. It is normal to feel sedated, tired and low in energy after a pain infusion. DO NOT DRIVE, OPERATE ANY MACHINERY, OR MAKE ANY IMPORTANT DECISIONS FOR AT LEAST 24 HOURS AFTER THE INFUSION.     2. Call the pain center at [phone] with any problems, questions, or concerns.     3. Eat light after the infusion. If you feel queasy or sick to your stomach, laying down with your eyes closed may help. When you resume eating start with something mild like clear liquids, yogurt, applesauce, crackers, etc… Gradually advance to a regular diet.     4. Do not leave your house alone the evening of your pain infusion.     5. No alcohol or sedative medications, such as sleeping pills, for 24 hours after your pain infusion.     6. Resume all other prescribed medications unless directed otherwise by you physician.     7. If you have any medical emergencies, call 911 or go directly to the closest emergency room.

## 2024-10-10 NOTE — PROGRESS NOTES
S: Patient here for 3 opioid sparing pain infusion. Patient reports 0% reduction in pain after last infusion that lasted no relief, headache after th einfusion    Purpose of pain infusion meds explained along with potential side effects.  Patient verbalized understanding.    B: Pain Issues 8/10 neck and shoulders Is Patient breast feeding: No    A: Patient currently has pain described on flow sheet documentation. Designated  is [employer]@ [phone]. Patient last ate solid food 4 hours ago, and had liquid 1 hours ago.    R: Plan; Obtain IV access, do patient risk assessment, and start opioid sparing infusion as ordered. Monitoring for S/S of adverse reactions.

## 2024-10-11 DIAGNOSIS — M79.7 FIBROMYALGIA: ICD-10-CM

## 2024-10-11 RX ORDER — TIZANIDINE 4 MG/1
4 TABLET ORAL EVERY 6 HOURS PRN
Qty: 80 TABLET | Refills: 0 | Status: SHIPPED | OUTPATIENT
Start: 2024-10-11 | End: 2024-11-10

## 2024-10-31 ENCOUNTER — APPOINTMENT (OUTPATIENT)
Dept: PRIMARY CARE | Facility: CLINIC | Age: 39
End: 2024-10-31
Payer: MEDICAID

## 2024-10-31 ENCOUNTER — INFUSION (OUTPATIENT)
Dept: INFUSION THERAPY | Facility: CLINIC | Age: 39
End: 2024-10-31
Payer: MEDICAID

## 2024-10-31 VITALS
HEART RATE: 85 BPM | DIASTOLIC BLOOD PRESSURE: 75 MMHG | OXYGEN SATURATION: 100 % | SYSTOLIC BLOOD PRESSURE: 129 MMHG | TEMPERATURE: 97.7 F | RESPIRATION RATE: 16 BRPM

## 2024-10-31 DIAGNOSIS — M79.7 FIBROMYALGIA: ICD-10-CM

## 2024-10-31 LAB — PREGNANCY TEST URINE, POC: NEGATIVE

## 2024-10-31 PROCEDURE — 96375 TX/PRO/DX INJ NEW DRUG ADDON: CPT | Mod: INF

## 2024-10-31 PROCEDURE — 2500000004 HC RX 250 GENERAL PHARMACY W/ HCPCS (ALT 636 FOR OP/ED): Performed by: NURSE PRACTITIONER

## 2024-10-31 PROCEDURE — 96368 THER/DIAG CONCURRENT INF: CPT | Mod: INF

## 2024-10-31 PROCEDURE — 96365 THER/PROPH/DIAG IV INF INIT: CPT | Mod: INF

## 2024-10-31 PROCEDURE — 81025 URINE PREGNANCY TEST: CPT

## 2024-10-31 RX ORDER — NITROGLYCERIN 0.4 MG/1
0.4 TABLET SUBLINGUAL ONCE
OUTPATIENT
Start: 2024-11-21 | End: 2024-11-21

## 2024-10-31 RX ORDER — ALBUTEROL SULFATE 0.83 MG/ML
3 SOLUTION RESPIRATORY (INHALATION) AS NEEDED
OUTPATIENT
Start: 2024-11-21

## 2024-10-31 RX ORDER — KETAMINE HCL IN NACL, ISO-OSM 100MG/10ML
SYRINGE (ML) INJECTION ONCE
OUTPATIENT
Start: 2024-11-21

## 2024-10-31 RX ORDER — KETOROLAC TROMETHAMINE 30 MG/ML
30 INJECTION, SOLUTION INTRAMUSCULAR; INTRAVENOUS ONCE
OUTPATIENT
Start: 2024-11-21 | End: 2024-11-21

## 2024-10-31 RX ORDER — DIPHENHYDRAMINE HYDROCHLORIDE 50 MG/ML
50 INJECTION INTRAMUSCULAR; INTRAVENOUS AS NEEDED
OUTPATIENT
Start: 2024-11-21

## 2024-10-31 RX ORDER — EPINEPHRINE 1 MG/ML
0.3 INJECTION, SOLUTION, CONCENTRATE INTRAVENOUS EVERY 5 MIN PRN
OUTPATIENT
Start: 2024-11-21

## 2024-10-31 RX ORDER — FAMOTIDINE 10 MG/ML
20 INJECTION INTRAVENOUS ONCE AS NEEDED
OUTPATIENT
Start: 2024-11-21

## 2024-10-31 RX ORDER — KETOROLAC TROMETHAMINE 30 MG/ML
30 INJECTION, SOLUTION INTRAMUSCULAR; INTRAVENOUS ONCE
Status: COMPLETED | OUTPATIENT
Start: 2024-10-31 | End: 2024-10-31

## 2024-10-31 RX ORDER — KETAMINE HCL IN NACL, ISO-OSM 100MG/10ML
SYRINGE (ML) INJECTION ONCE
Status: COMPLETED | OUTPATIENT
Start: 2024-10-31 | End: 2024-10-31

## 2024-10-31 RX ORDER — ONDANSETRON HYDROCHLORIDE 2 MG/ML
4 INJECTION, SOLUTION INTRAVENOUS ONCE
OUTPATIENT
Start: 2024-11-21 | End: 2024-11-21

## 2024-10-31 ASSESSMENT — ENCOUNTER SYMPTOMS
DEPRESSION: 1
OCCASIONAL FEELINGS OF UNSTEADINESS: 1
LOSS OF SENSATION IN FEET: 1

## 2024-10-31 ASSESSMENT — COLUMBIA-SUICIDE SEVERITY RATING SCALE - C-SSRS
6. HAVE YOU EVER DONE ANYTHING, STARTED TO DO ANYTHING, OR PREPARED TO DO ANYTHING TO END YOUR LIFE?: NO
1. IN THE PAST MONTH, HAVE YOU WISHED YOU WERE DEAD OR WISHED YOU COULD GO TO SLEEP AND NOT WAKE UP?: NO
2. HAVE YOU ACTUALLY HAD ANY THOUGHTS OF KILLING YOURSELF?: NO

## 2024-10-31 ASSESSMENT — PAIN SCALES - GENERAL
PAINLEVEL_OUTOF10: 3
PAINLEVEL_OUTOF10: 7

## 2024-11-11 DIAGNOSIS — M79.7 FIBROMYALGIA: ICD-10-CM

## 2024-11-11 RX ORDER — TIZANIDINE 4 MG/1
4 TABLET ORAL EVERY 6 HOURS PRN
Qty: 80 TABLET | Refills: 0 | Status: SHIPPED | OUTPATIENT
Start: 2024-11-11 | End: 2024-12-11

## 2024-11-21 ENCOUNTER — INFUSION (OUTPATIENT)
Dept: INFUSION THERAPY | Facility: CLINIC | Age: 39
End: 2024-11-21
Payer: MEDICAID

## 2024-11-21 VITALS
TEMPERATURE: 97.9 F | SYSTOLIC BLOOD PRESSURE: 128 MMHG | RESPIRATION RATE: 20 BRPM | OXYGEN SATURATION: 94 % | HEART RATE: 91 BPM | DIASTOLIC BLOOD PRESSURE: 72 MMHG

## 2024-11-21 DIAGNOSIS — M79.7 FIBROMYALGIA: ICD-10-CM

## 2024-11-21 LAB — PREGNANCY TEST URINE, POC: NEGATIVE

## 2024-11-21 PROCEDURE — 96365 THER/PROPH/DIAG IV INF INIT: CPT | Mod: INF

## 2024-11-21 PROCEDURE — 81025 URINE PREGNANCY TEST: CPT

## 2024-11-21 PROCEDURE — 2500000004 HC RX 250 GENERAL PHARMACY W/ HCPCS (ALT 636 FOR OP/ED): Performed by: NURSE PRACTITIONER

## 2024-11-21 PROCEDURE — 96368 THER/DIAG CONCURRENT INF: CPT | Mod: INF

## 2024-11-21 PROCEDURE — 96375 TX/PRO/DX INJ NEW DRUG ADDON: CPT | Mod: INF

## 2024-11-21 RX ORDER — NITROGLYCERIN 0.4 MG/1
0.4 TABLET SUBLINGUAL ONCE
OUTPATIENT
Start: 2024-12-12 | End: 2024-12-12

## 2024-11-21 RX ORDER — FAMOTIDINE 10 MG/ML
20 INJECTION INTRAVENOUS ONCE AS NEEDED
OUTPATIENT
Start: 2024-12-12

## 2024-11-21 RX ORDER — DIPHENHYDRAMINE HYDROCHLORIDE 50 MG/ML
50 INJECTION INTRAMUSCULAR; INTRAVENOUS AS NEEDED
OUTPATIENT
Start: 2024-12-12

## 2024-11-21 RX ORDER — EPINEPHRINE 1 MG/ML
0.3 INJECTION, SOLUTION, CONCENTRATE INTRAVENOUS EVERY 5 MIN PRN
OUTPATIENT
Start: 2024-12-12

## 2024-11-21 RX ORDER — KETOROLAC TROMETHAMINE 30 MG/ML
30 INJECTION, SOLUTION INTRAMUSCULAR; INTRAVENOUS ONCE
OUTPATIENT
Start: 2024-12-12 | End: 2024-12-12

## 2024-11-21 RX ORDER — PREGABALIN 225 MG/1
225 CAPSULE ORAL 2 TIMES DAILY
Status: CANCELLED | OUTPATIENT
Start: 2024-11-21

## 2024-11-21 RX ORDER — KETAMINE HCL IN NACL, ISO-OSM 100MG/10ML
SYRINGE (ML) INJECTION ONCE
Status: COMPLETED | OUTPATIENT
Start: 2024-11-21 | End: 2024-11-21

## 2024-11-21 RX ORDER — ONDANSETRON HYDROCHLORIDE 2 MG/ML
4 INJECTION, SOLUTION INTRAVENOUS ONCE
OUTPATIENT
Start: 2024-12-12 | End: 2024-12-12

## 2024-11-21 RX ORDER — ALBUTEROL SULFATE 0.83 MG/ML
3 SOLUTION RESPIRATORY (INHALATION) AS NEEDED
OUTPATIENT
Start: 2024-12-12

## 2024-11-21 RX ORDER — KETAMINE HCL IN NACL, ISO-OSM 100MG/10ML
SYRINGE (ML) INJECTION ONCE
OUTPATIENT
Start: 2024-12-12

## 2024-11-21 RX ORDER — KETOROLAC TROMETHAMINE 30 MG/ML
30 INJECTION, SOLUTION INTRAMUSCULAR; INTRAVENOUS ONCE
Status: COMPLETED | OUTPATIENT
Start: 2024-11-21 | End: 2024-11-21

## 2024-11-21 ASSESSMENT — ENCOUNTER SYMPTOMS
DEPRESSION: 0
OCCASIONAL FEELINGS OF UNSTEADINESS: 1
LOSS OF SENSATION IN FEET: 1

## 2024-11-21 ASSESSMENT — PAIN SCALES - GENERAL
PAINLEVEL_OUTOF10: 8
PAINLEVEL_OUTOF10: 6

## 2024-11-21 NOTE — PATIENT INSTRUCTIONS
Today :Marilyn HASSANLester Karla had no medications administered during this visit.     For:   1. Fibromyalgia         Your next appointment is due in:  see avs        Please read the  Medication Guide that was given to you and reviewed during todays visit.     (Tell all doctors including dentists that you are taking this medication)     Go to the emergency room or call 911 if:  -You have signs of allergic reaction:   -Rash, hives, itching.   -Swollen, blistered, peeling skin.   -Swelling of face, lips, mouth, tongue or throat.   -Tightness of chest, trouble breathing, swallowing or talking     Call your doctor:  - If IV / injection site gets red, warm, swollen, itchy or leaks fluid or pus.     (Leave dressing on your IV site for at least 2 hours and keep area clean and dry  - If you get sick or have symptoms of infection or are not feeling well for any reason.    (Wash your hands often, stay away from people who are sick)  - If you have side effects from your medication that do not go away or are bothersome.     (Refer to the teaching your nurse gave you for side effects to call your doctor about)    - Common side effects may include:  stuffy nose, headache, feeling tired, muscle aches, upset stomach  - Before receiving any vaccines     - Call [facility] at   If:  - You get sick, are on antibiotics, have had a recent vaccine, have surgery or dental work and your doctor wants your visit rescheduled.  - You need to cancel and reschedule your visit for any reason. Call at least 2 days before your visit if you need to cancel.   - Your insurance changes before your next visit.    (We will need to get approval from your new insurance. This can take up to two weeks.)     [facility] is opened Monday thru Friday. We are closed on weekends and holidays.   Voice mail will take your call if the center is closed. If you leave a message please allow 24 hours for a call back during weekdays. If you  leave a message on a weekend/holiday, we will call you back the next business day.    A pharmacist is available Monday - Friday from 8:30AM to 3:30PM to help answer any questions you may have about your prescriptions(s). Please call pharmacy at:    [facility]: [phone]  [facility]: [phone]  [facility]: [phone]              [facility]      Pain Infusion Aftercare Instructions      1. It is normal to feel sedated, tired and low in energy after a pain infusion. DO NOT DRIVE, OPERATE ANY MACHINERY, OR MAKE ANY IMPORTANT DECISIONS FOR AT LEAST 24 HOURS AFTER THE INFUSION.     2. Call the pain center at [phone] with any problems, questions, or concerns.     3. Eat light after the infusion. If you feel queasy or sick to your stomach, laying down with your eyes closed may help. When you resume eating start with something mild like clear liquids, yogurt, applesauce, crackers, etc… Gradually advance to a regular diet.     4. Do not leave your house alone the evening of your pain infusion.     5. No alcohol or sedative medications, such as sleeping pills, for 24 hours after your pain infusion.     6. Resume all other prescribed medications unless directed otherwise by you physician.     7. If you have any medical emergencies, call 911 or go directly to the closest emergency room.

## 2024-11-22 ENCOUNTER — PATIENT MESSAGE (OUTPATIENT)
Dept: PAIN MEDICINE | Facility: CLINIC | Age: 39
End: 2024-11-22
Payer: MEDICAID

## 2024-11-22 DIAGNOSIS — M79.7 FIBROMYALGIA: Primary | ICD-10-CM

## 2024-12-02 ENCOUNTER — APPOINTMENT (OUTPATIENT)
Dept: PAIN MEDICINE | Facility: CLINIC | Age: 39
End: 2024-12-02
Payer: MEDICAID

## 2024-12-03 RX ORDER — PREGABALIN 150 MG/1
150 CAPSULE ORAL 2 TIMES DAILY
Qty: 20 CAPSULE | Refills: 0 | Status: SHIPPED | OUTPATIENT
Start: 2024-12-03 | End: 2024-12-05 | Stop reason: SDUPTHER

## 2024-12-05 ENCOUNTER — OFFICE VISIT (OUTPATIENT)
Dept: PAIN MEDICINE | Facility: CLINIC | Age: 39
End: 2024-12-05
Payer: MEDICAID

## 2024-12-05 VITALS
BODY MASS INDEX: 21.34 KG/M2 | TEMPERATURE: 97.2 F | DIASTOLIC BLOOD PRESSURE: 77 MMHG | SYSTOLIC BLOOD PRESSURE: 116 MMHG | WEIGHT: 125 LBS | HEART RATE: 117 BPM | RESPIRATION RATE: 18 BRPM | HEIGHT: 64 IN | OXYGEN SATURATION: 100 %

## 2024-12-05 DIAGNOSIS — Z51.81 THERAPEUTIC DRUG MONITORING: ICD-10-CM

## 2024-12-05 DIAGNOSIS — M54.12 CERVICAL RADICULOPATHY: ICD-10-CM

## 2024-12-05 DIAGNOSIS — M79.7 FIBROMYALGIA: Primary | ICD-10-CM

## 2024-12-05 PROCEDURE — 80307 DRUG TEST PRSMV CHEM ANLYZR: CPT | Performed by: ANESTHESIOLOGY

## 2024-12-05 PROCEDURE — 80359 METHYLENEDIOXYAMPHETAMINES: CPT | Performed by: ANESTHESIOLOGY

## 2024-12-05 PROCEDURE — 99213 OFFICE O/P EST LOW 20 MIN: CPT | Performed by: ANESTHESIOLOGY

## 2024-12-05 PROCEDURE — 80366 DRUG SCREENING PREGABALIN: CPT | Performed by: ANESTHESIOLOGY

## 2024-12-05 PROCEDURE — 3008F BODY MASS INDEX DOCD: CPT | Performed by: ANESTHESIOLOGY

## 2024-12-05 RX ORDER — PREGABALIN 150 MG/1
150 CAPSULE ORAL 2 TIMES DAILY
Qty: 60 CAPSULE | Refills: 5 | Status: SHIPPED | OUTPATIENT
Start: 2024-12-05 | End: 2025-06-03

## 2024-12-05 ASSESSMENT — PAIN SCALES - GENERAL
PAINLEVEL_OUTOF10: 6
PAINLEVEL_OUTOF10: 6

## 2024-12-05 ASSESSMENT — ENCOUNTER SYMPTOMS
SHORTNESS OF BREATH: 0
DEPRESSION: 0
NECK PAIN: 1
LOSS OF SENSATION IN FEET: 0
FEVER: 0
OCCASIONAL FEELINGS OF UNSTEADINESS: 0

## 2024-12-05 ASSESSMENT — PATIENT HEALTH QUESTIONNAIRE - PHQ9
2. FEELING DOWN, DEPRESSED OR HOPELESS: NOT AT ALL
SUM OF ALL RESPONSES TO PHQ9 QUESTIONS 1 AND 2: 0
1. LITTLE INTEREST OR PLEASURE IN DOING THINGS: NOT AT ALL

## 2024-12-05 ASSESSMENT — PAIN - FUNCTIONAL ASSESSMENT: PAIN_FUNCTIONAL_ASSESSMENT: 0-10

## 2024-12-05 ASSESSMENT — PAIN DESCRIPTION - DESCRIPTORS: DESCRIPTORS: SHOOTING;STABBING

## 2024-12-05 NOTE — PROGRESS NOTES
Chief Complain    Follow-up (For pain in neck and right shoulder and been having increased low back pain over the last few weeks) and Med Management (Started the pregablin back up and it help with pain./Is getting infusions and is starting see improvement.)    History Of Present Illness  Marilyn Acosta is a 39 y.o. female here for evaluation of right sided neck and low back pain, radiating to bilateral shoulder blades. The patient has been experiencing these symptoms for last several year(s). The patient describes the pain as burning and stabbing. The patient's current pain score is 6 on a scale from 0-10. The pain is worsened by prolonged sitting and is alleviated by nothing relieves the pain. Since the last visit the pain has unchanged.      Past Medical History  She has a past medical history of Personal history of other diseases of the nervous system and sense organs.    Surgical History  She has a past surgical history that includes Other surgical history (05/06/2022) and Other surgical history (05/06/2022).    Social History  She reports that she has been smoking cigarettes. She has never used smokeless tobacco. She reports that she does not currently use alcohol. She reports that she does not currently use drugs.    Family History  No family history on file.     Allergies  Patient has no known allergies.    Review of Systems  Review of Systems   Constitutional:  Negative for fever.   Respiratory:  Negative for shortness of breath.    Cardiovascular:  Negative for chest pain.   Musculoskeletal:  Positive for neck pain.   Psychiatric/Behavioral:  Negative for suicidal ideas.         Physical Exam  Physical Exam  HENT:      Head: Normocephalic.   Eyes:      Extraocular Movements: Extraocular movements intact.      Pupils: Pupils are equal, round, and reactive to light.   Pulmonary:      Effort: Pulmonary effort is normal.   Neurological:      Mental Status: She is alert and oriented to person, place, and time.  "  Psychiatric:         Mood and Affect: Mood normal.         Last Recorded Vitals  Blood pressure 116/77, pulse (!) 117, temperature 36.2 °C (97.2 °F), resp. rate 18, height 1.626 m (5' 4\"), weight 56.7 kg (125 lb), SpO2 100%, not currently breastfeeding.    Reviewed Images  Reviewed and independently interpreted MRI Lumbar spine facet arthrosis at L4-5 with facet effusion with some lateral recess stenosis       Assessment/Plan   Encounter Diagnosis   Name Primary?    Opiate analgesic contract exists Yes          Marilyn Acosta is a 39 y.o. female here for evaluation of chronic neck pain radiating bilateral scapular area, tingling and numbness bilateral upper extremities, low back pain radiating to left lower extremity.  She has been experiencing the symptoms for years.  She has previously had couple of cervical fusions first 1 was ACDF by Dr. Dennis in 2019 the next year she also had C6-7 posterior fusion.  She did improve after the surgery, since then she has had multiple other interventions including cervical medial branch blocks and radiofrequency ablations.  She also did 20 sessions of physical therapy at [facility] and aquatics at [place] in 2023 She did not notice any significant benefit.  Review of her recent MRI of her cervical spine which did reveal some disc bulge at C5-6 with moderate left neural foraminal stenosis.  Currently managing her pain with Lyrica which does provide her with significant relief.  Denies any significant side effects.  A controlled substance agreement for Lyrica was discussed with patient.  An updated UDS was collected.  She was also getting ketamine infusions she has had 4 infusions, she has started to see some improvement after her fourth infusion.  She has 2 more scheduled.  Recommend that she finishes off the 2 remaining infusions would decide on continuation based on the outcome of those infusions.  I have personally reviewed the OARRS report.  I have considered the " risks of abuse, dependence, addiction and diversion.             Faizan Lang MD

## 2024-12-06 LAB
AMPHETAMINES UR QL SCN: ABNORMAL
BARBITURATES UR QL SCN: ABNORMAL
BENZODIAZ UR QL SCN: ABNORMAL
BZE UR QL SCN: ABNORMAL
CANNABINOIDS UR QL SCN: ABNORMAL
FENTANYL+NORFENTANYL UR QL SCN: ABNORMAL
METHADONE UR QL SCN: ABNORMAL
OPIATES UR QL SCN: ABNORMAL
OXYCODONE+OXYMORPHONE UR QL SCN: ABNORMAL
PCP UR QL SCN: ABNORMAL

## 2024-12-10 LAB
AMPHET UR-MCNC: >5000 NG/ML
MDA UR-MCNC: <200 NG/ML
MDEA UR-MCNC: <200 NG/ML
MDMA UR-MCNC: <200 NG/ML
METHAMPHET UR-MCNC: <200 NG/ML
PHENTERMINE UR CFM-MCNC: <200 NG/ML

## 2024-12-12 ENCOUNTER — INFUSION (OUTPATIENT)
Dept: INFUSION THERAPY | Facility: CLINIC | Age: 39
End: 2024-12-12
Payer: MEDICAID

## 2024-12-12 VITALS
OXYGEN SATURATION: 100 % | RESPIRATION RATE: 14 BRPM | TEMPERATURE: 98.4 F | DIASTOLIC BLOOD PRESSURE: 76 MMHG | SYSTOLIC BLOOD PRESSURE: 118 MMHG | HEART RATE: 93 BPM

## 2024-12-12 DIAGNOSIS — M79.7 FIBROMYALGIA: ICD-10-CM

## 2024-12-12 LAB
PREGABALIN UR CFM-MCNC: >500 UG/ML
PREGNANCY TEST URINE, POC: NEGATIVE

## 2024-12-12 PROCEDURE — 96368 THER/DIAG CONCURRENT INF: CPT | Mod: INF

## 2024-12-12 PROCEDURE — 96375 TX/PRO/DX INJ NEW DRUG ADDON: CPT | Mod: INF

## 2024-12-12 PROCEDURE — 81025 URINE PREGNANCY TEST: CPT

## 2024-12-12 PROCEDURE — 96365 THER/PROPH/DIAG IV INF INIT: CPT | Mod: INF

## 2024-12-12 PROCEDURE — 2500000004 HC RX 250 GENERAL PHARMACY W/ HCPCS (ALT 636 FOR OP/ED): Performed by: NURSE PRACTITIONER

## 2024-12-12 RX ORDER — ALBUTEROL SULFATE 0.83 MG/ML
3 SOLUTION RESPIRATORY (INHALATION) AS NEEDED
OUTPATIENT
Start: 2025-01-02

## 2024-12-12 RX ORDER — NITROGLYCERIN 0.4 MG/1
0.4 TABLET SUBLINGUAL ONCE
OUTPATIENT
Start: 2025-01-02 | End: 2025-01-02

## 2024-12-12 RX ORDER — KETAMINE HCL IN NACL, ISO-OSM 100MG/10ML
SYRINGE (ML) INJECTION ONCE
Status: COMPLETED | OUTPATIENT
Start: 2024-12-12 | End: 2024-12-12

## 2024-12-12 RX ORDER — FAMOTIDINE 10 MG/ML
20 INJECTION INTRAVENOUS ONCE AS NEEDED
OUTPATIENT
Start: 2025-01-02

## 2024-12-12 RX ORDER — KETOROLAC TROMETHAMINE 30 MG/ML
30 INJECTION, SOLUTION INTRAMUSCULAR; INTRAVENOUS ONCE
Status: COMPLETED | OUTPATIENT
Start: 2024-12-12 | End: 2024-12-12

## 2024-12-12 RX ORDER — DIPHENHYDRAMINE HYDROCHLORIDE 50 MG/ML
50 INJECTION INTRAMUSCULAR; INTRAVENOUS AS NEEDED
OUTPATIENT
Start: 2025-01-02

## 2024-12-12 RX ORDER — KETAMINE HCL IN NACL, ISO-OSM 100MG/10ML
SYRINGE (ML) INJECTION ONCE
Status: CANCELLED | OUTPATIENT
Start: 2025-01-02

## 2024-12-12 RX ORDER — ONDANSETRON HYDROCHLORIDE 2 MG/ML
4 INJECTION, SOLUTION INTRAVENOUS ONCE
OUTPATIENT
Start: 2025-01-02 | End: 2025-01-02

## 2024-12-12 RX ORDER — KETOROLAC TROMETHAMINE 30 MG/ML
30 INJECTION, SOLUTION INTRAMUSCULAR; INTRAVENOUS ONCE
Status: CANCELLED | OUTPATIENT
Start: 2025-01-02 | End: 2025-01-02

## 2024-12-12 RX ORDER — EPINEPHRINE 1 MG/ML
0.3 INJECTION, SOLUTION, CONCENTRATE INTRAVENOUS EVERY 5 MIN PRN
OUTPATIENT
Start: 2025-01-02

## 2024-12-12 ASSESSMENT — ENCOUNTER SYMPTOMS
OCCASIONAL FEELINGS OF UNSTEADINESS: 1
DEPRESSION: 1
LOSS OF SENSATION IN FEET: 1

## 2024-12-12 ASSESSMENT — PAIN SCALES - GENERAL
PAINLEVEL_OUTOF10: 8
PAINLEVEL_OUTOF10: 8

## 2024-12-12 NOTE — PATIENT INSTRUCTIONS
Today :We administered ketamine 30 mg-lidocaine 300 mg, propofol, and ketorolac.     For:   1. Fibromyalgia         Your next appointment is due in:  SEE AVS        Please read the  Medication Guide that was given to you and reviewed during todays visit.     (Tell all doctors including dentists that you are taking this medication)     Go to the emergency room or call 911 if:  -You have signs of allergic reaction:   -Rash, hives, itching.   -Swollen, blistered, peeling skin.   -Swelling of face, lips, mouth, tongue or throat.   -Tightness of chest, trouble breathing, swallowing or talking     Call your doctor:  - If IV / injection site gets red, warm, swollen, itchy or leaks fluid or pus.     (Leave dressing on your IV site for at least 2 hours and keep area clean and dry  - If you get sick or have symptoms of infection or are not feeling well for any reason.    (Wash your hands often, stay away from people who are sick)  - If you have side effects from your medication that do not go away or are bothersome.     (Refer to the teaching your nurse gave you for side effects to call your doctor about)    - Common side effects may include:  stuffy nose, headache, feeling tired, muscle aches, upset stomach  - Before receiving any vaccines     - Call [facility] at   If:  - You get sick, are on antibiotics, have had a recent vaccine, have surgery or dental work and your doctor wants your visit rescheduled.  - You need to cancel and reschedule your visit for any reason. Call at least 2 days before your visit if you need to cancel.   - Your insurance changes before your next visit.    (We will need to get approval from your new insurance. This can take up to two weeks.)     [facility] is opened Monday thru Friday. We are closed on weekends and holidays.   Voice mail will take your call if the center is closed. If you leave a message please allow 24 hours for a call back during weekdays.  If you leave a message on a weekend/holiday, we will call you back the next business day.    A pharmacist is available Monday - Friday from 8:30AM to 3:30PM to help answer any questions you may have about your prescriptions(s). Please call pharmacy at:    [facility]: [phone]  [facility]: [phone]  [facility]: [phone]              [facility]      Pain Infusion Aftercare Instructions      1. It is normal to feel sedated, tired and low in energy after a pain infusion. DO NOT DRIVE, OPERATE ANY MACHINERY, OR MAKE ANY IMPORTANT DECISIONS FOR AT LEAST 24 HOURS AFTER THE INFUSION.     2. Call the pain center at [phone] with any problems, questions, or concerns.     3. Eat light after the infusion. If you feel queasy or sick to your stomach, laying down with your eyes closed may help. When you resume eating start with something mild like clear liquids, yogurt, applesauce, crackers, etc… Gradually advance to a regular diet.     4. Do not leave your house alone the evening of your pain infusion.     5. No alcohol or sedative medications, such as sleeping pills, for 24 hours after your pain infusion.     6. Resume all other prescribed medications unless directed otherwise by you physician.     7. If you have any medical emergencies, call 911 or go directly to the closest emergency room.

## 2024-12-30 RX ORDER — KETOROLAC TROMETHAMINE 30 MG/ML
30 INJECTION, SOLUTION INTRAMUSCULAR; INTRAVENOUS ONCE
Status: CANCELLED | OUTPATIENT
Start: 2025-01-02 | End: 2025-01-02

## 2024-12-30 RX ORDER — KETAMINE HCL IN NACL, ISO-OSM 100MG/10ML
SYRINGE (ML) INJECTION ONCE
Status: CANCELLED | OUTPATIENT
Start: 2025-01-02

## 2025-01-02 ENCOUNTER — INFUSION (OUTPATIENT)
Dept: INFUSION THERAPY | Facility: CLINIC | Age: 40
End: 2025-01-02
Payer: MEDICAID

## 2025-01-02 VITALS
RESPIRATION RATE: 16 BRPM | SYSTOLIC BLOOD PRESSURE: 114 MMHG | HEART RATE: 79 BPM | DIASTOLIC BLOOD PRESSURE: 72 MMHG | TEMPERATURE: 96.8 F | OXYGEN SATURATION: 100 %

## 2025-01-02 DIAGNOSIS — M79.7 FIBROMYALGIA: ICD-10-CM

## 2025-01-02 LAB — PREGNANCY TEST URINE, POC: NEGATIVE

## 2025-01-02 PROCEDURE — 96368 THER/DIAG CONCURRENT INF: CPT | Mod: INF

## 2025-01-02 PROCEDURE — 81025 URINE PREGNANCY TEST: CPT

## 2025-01-02 PROCEDURE — 96365 THER/PROPH/DIAG IV INF INIT: CPT | Mod: INF

## 2025-01-02 PROCEDURE — 96375 TX/PRO/DX INJ NEW DRUG ADDON: CPT | Mod: INF

## 2025-01-02 PROCEDURE — 2500000004 HC RX 250 GENERAL PHARMACY W/ HCPCS (ALT 636 FOR OP/ED): Performed by: NURSE PRACTITIONER

## 2025-01-02 RX ORDER — KETOROLAC TROMETHAMINE 30 MG/ML
30 INJECTION, SOLUTION INTRAMUSCULAR; INTRAVENOUS ONCE
Status: COMPLETED | OUTPATIENT
Start: 2025-01-02 | End: 2025-01-02

## 2025-01-02 RX ORDER — KETOROLAC TROMETHAMINE 30 MG/ML
30 INJECTION, SOLUTION INTRAMUSCULAR; INTRAVENOUS ONCE
OUTPATIENT
Start: 2025-01-23 | End: 2025-01-23

## 2025-01-02 RX ORDER — ONDANSETRON HYDROCHLORIDE 2 MG/ML
4 INJECTION, SOLUTION INTRAVENOUS ONCE
OUTPATIENT
Start: 2025-01-23 | End: 2025-01-23

## 2025-01-02 RX ORDER — KETAMINE HCL IN NACL, ISO-OSM 100MG/10ML
SYRINGE (ML) INJECTION ONCE
OUTPATIENT
Start: 2025-01-23

## 2025-01-02 RX ORDER — KETAMINE HCL IN NACL, ISO-OSM 100MG/10ML
SYRINGE (ML) INJECTION ONCE
Status: COMPLETED | OUTPATIENT
Start: 2025-01-02 | End: 2025-01-02

## 2025-01-02 RX ORDER — FAMOTIDINE 10 MG/ML
20 INJECTION INTRAVENOUS ONCE AS NEEDED
OUTPATIENT
Start: 2025-01-23

## 2025-01-02 RX ORDER — EPINEPHRINE 1 MG/ML
0.3 INJECTION, SOLUTION, CONCENTRATE INTRAVENOUS EVERY 5 MIN PRN
OUTPATIENT
Start: 2025-01-23

## 2025-01-02 RX ORDER — NITROGLYCERIN 0.4 MG/1
0.4 TABLET SUBLINGUAL ONCE
OUTPATIENT
Start: 2025-01-23 | End: 2025-01-23

## 2025-01-02 RX ORDER — DIPHENHYDRAMINE HYDROCHLORIDE 50 MG/ML
50 INJECTION INTRAMUSCULAR; INTRAVENOUS AS NEEDED
OUTPATIENT
Start: 2025-01-23

## 2025-01-02 RX ORDER — ALBUTEROL SULFATE 0.83 MG/ML
3 SOLUTION RESPIRATORY (INHALATION) AS NEEDED
OUTPATIENT
Start: 2025-01-23

## 2025-01-02 RX ADMIN — KETOROLAC TROMETHAMINE 30 MG: 30 INJECTION, SOLUTION INTRAMUSCULAR at 11:52

## 2025-01-02 RX ADMIN — PROPOFOL 100 MG: 10 INJECTION, EMULSION INTRAVENOUS at 11:54

## 2025-01-02 RX ADMIN — Medication: at 11:53

## 2025-01-02 ASSESSMENT — ENCOUNTER SYMPTOMS
OCCASIONAL FEELINGS OF UNSTEADINESS: 1
DEPRESSION: 1
LOSS OF SENSATION IN FEET: 1

## 2025-01-02 ASSESSMENT — PAIN SCALES - GENERAL
PAINLEVEL_OUTOF10: 9
PAINLEVEL_OUTOF10: 9

## 2025-01-02 NOTE — PATIENT INSTRUCTIONS
Today :We administered ketamine 30 mg-lidocaine 300 mg, propofol, and ketorolac.     For:   1. Fibromyalgia         (Tell all doctors including dentists that you are taking this medication)     Go to the emergency room or call 911 if:  -You have signs of allergic reaction:   -Rash, hives, itching.   -Swollen, blistered, peeling skin.   -Swelling of face, lips, mouth, tongue or throat.   -Tightness of chest, trouble breathing, swallowing or talking     Call your doctor:  - If IV / injection site gets red, warm, swollen, itchy or leaks fluid or pus.     (Leave dressing on your IV site for at least 2 hours and keep area clean and dry  - If you get sick or have symptoms of infection or are not feeling well for any reason.    (Wash your hands often, stay away from people who are sick)  - If you have side effects from your medication that do not go away or are bothersome.     (Refer to the teaching your nurse gave you for side effects to call your doctor about)    - Common side effects may include:  stuffy nose, headache, feeling tired, muscle aches, upset stomach  - Before receiving any vaccines     - Call [facility] at   If:  - You get sick, are on antibiotics, have had a recent vaccine, have surgery or dental work and your doctor wants your visit rescheduled.  - You need to cancel and reschedule your visit for any reason. Call at least 2 days before your visit if you need to cancel.   - Your insurance changes before your next visit.    (We will need to get approval from your new insurance. This can take up to two weeks.)     [facility] is opened Monday thru Friday. We are closed on weekends and holidays.   Voice mail will take your call if the center is closed. If you leave a message please allow 24 hours for a call back during weekdays. If you leave a message on a weekend/holiday, we will call you back the next business day.    A pharmacist is available Monday - Friday from 8:30AM to  3:30PM to help answer any questions you may have about your prescriptions(s). Please call pharmacy at:    [facility]: [phone]  [facility]: [phone]  [facility]: [phone]              [facility]      Pain Infusion Aftercare Instructions      1. It is normal to feel sedated, tired and low in energy after a pain infusion. DO NOT DRIVE, OPERATE ANY MACHINERY, OR MAKE ANY IMPORTANT DECISIONS FOR AT LEAST 24 HOURS AFTER THE INFUSION.     2. Call the pain center at [phone] with any problems, questions, or concerns.     3. Eat light after the infusion. If you feel queasy or sick to your stomach, laying down with your eyes closed may help. When you resume eating start with something mild like clear liquids, yogurt, applesauce, crackers, etc… Gradually advance to a regular diet.     4. Do not leave your house alone the evening of your pain infusion.     5. No alcohol or sedative medications, such as sleeping pills, for 24 hours after your pain infusion.     6. Resume all other prescribed medications unless directed otherwise by you physician.     7. If you have any medical emergencies, call 911 or go directly to the closest emergency room.

## 2025-01-02 NOTE — PROGRESS NOTES
S: Patient here for 6th opioid sparing pain infusion. Patient reports 0% reduction in pain after last infusion.    Purpose of pain infusion meds explained along with potential side effects.  Patient verbalized understanding.    B: Pain Issues  in spine, neck, shoulders   Is Patient breast feeding: no    A: Patient currently has pain described on flow sheet documentation. Designated  is Leonela Acosta at [phone]. Patient last ate solid food >4 hours ago, and had liquid 1 hours ago.    R: Plan; Obtain IV access, do patient risk assessment, and start opioid sparing infusion as ordered. Monitoring for S/S of adverse reactions.    1251- Post infusion teaching provided. Patient verbalized understanding. VSS, Patient states pain is 9, no change in pain. Will assist patient to waiting car via wheelchair.

## 2025-01-04 ENCOUNTER — PATIENT MESSAGE (OUTPATIENT)
Dept: PAIN MEDICINE | Facility: CLINIC | Age: 40
End: 2025-01-04
Payer: MEDICAID

## 2025-01-09 ENCOUNTER — TELEMEDICINE (OUTPATIENT)
Dept: PAIN MEDICINE | Facility: CLINIC | Age: 40
End: 2025-01-09
Payer: MEDICAID

## 2025-01-09 DIAGNOSIS — M79.7 FIBROMYALGIA: Primary | ICD-10-CM

## 2025-01-09 DIAGNOSIS — M54.12 CERVICAL RADICULOPATHY: ICD-10-CM

## 2025-01-09 RX ORDER — ETODOLAC 200 MG/1
200 CAPSULE ORAL 2 TIMES DAILY
Qty: 60 CAPSULE | Refills: 0 | Status: SHIPPED | OUTPATIENT
Start: 2025-01-09 | End: 2025-02-08

## 2025-01-09 ASSESSMENT — PATIENT HEALTH QUESTIONNAIRE - PHQ9
1. LITTLE INTEREST OR PLEASURE IN DOING THINGS: NOT AT ALL
SUM OF ALL RESPONSES TO PHQ9 QUESTIONS 1 AND 2: 0
2. FEELING DOWN, DEPRESSED OR HOPELESS: NOT AT ALL

## 2025-01-09 ASSESSMENT — ENCOUNTER SYMPTOMS
NECK PAIN: 1
LOSS OF SENSATION IN FEET: 0
DEPRESSION: 0
SHORTNESS OF BREATH: 0
OCCASIONAL FEELINGS OF UNSTEADINESS: 0
FEVER: 0

## 2025-01-09 ASSESSMENT — PAIN SCALES - GENERAL: PAINLEVEL_OUTOF10: 8

## 2025-01-09 ASSESSMENT — PAIN - FUNCTIONAL ASSESSMENT: PAIN_FUNCTIONAL_ASSESSMENT: 0-10

## 2025-01-09 ASSESSMENT — PAIN DESCRIPTION - DESCRIPTORS: DESCRIPTORS: ACHING

## 2025-01-09 NOTE — PROGRESS NOTES
Virtual or Telephone Consent    A telephone visit (audio only) between the patient (at the originating site) and the provider (at the distant site) was utilized to provide this telehealth service.   Verbal consent was requested and obtained from Marilyn Acosta on this date, 01/09/25 for a telehealth visit.     Chief Complain    Follow-up (Would like to discuss infusions. /They are not helping. /Would like to discuss a medication/Pain, from bottom of skull down to shoulder blades.)    History Of Present Illness  Marilyn Acosta is a 39 y.o. female here for evaluation of right sided neck and low back pain, radiating to bilateral shoulder blades. The patient has been experiencing these symptoms for last several year(s). The patient describes the pain as burning and stabbing. The patient's current pain score is 7 on a scale from 0-10. The pain is worsened by prolonged sitting and is alleviated by nothing relieves the pain. Since the last visit the pain has unchanged.      Past Medical History  She has a past medical history of Personal history of other diseases of the nervous system and sense organs.    Surgical History  She has a past surgical history that includes Other surgical history (05/06/2022) and Other surgical history (05/06/2022).    Social History  She reports that she has been smoking cigarettes. She has never used smokeless tobacco. She reports that she does not currently use alcohol. She reports that she does not currently use drugs.    Family History  No family history on file.     Allergies  Patient has no known allergies.    Review of Systems  Review of Systems   Constitutional:  Negative for fever.   Respiratory:  Negative for shortness of breath.    Cardiovascular:  Negative for chest pain.   Musculoskeletal:  Positive for neck pain.   Psychiatric/Behavioral:  Negative for suicidal ideas.         Physical Exam  Physical Exam  HENT:      Head: Normocephalic.   Eyes:      Extraocular Movements:  Extraocular movements intact.      Pupils: Pupils are equal, round, and reactive to light.   Pulmonary:      Effort: Pulmonary effort is normal.   Neurological:      Mental Status: She is alert and oriented to person, place, and time.   Psychiatric:         Mood and Affect: Mood normal.         Last Recorded Vitals  not currently breastfeeding.    Reviewed Images  Reviewed and independently interpreted MRI Lumbar spine facet arthrosis at L4-5 with facet effusion with some lateral recess stenosis       Assessment/Plan   Encounter Diagnoses   Name Primary?    Fibromyalgia Yes    Cervical radiculopathy             Marilyn Acosta is a 39 y.o. female here for evaluation of chronic neck pain radiating bilateral scapular area, tingling and numbness bilateral upper extremities, low back pain radiating to left lower extremity.  She has been experiencing the symptoms for years.  She has previously had couple of cervical fusions first 1 was ACDF by Dr. Dennis in 2019 the next year she also had C6-7 posterior fusion.  She did improve after the surgery, since then she has had multiple other interventions including cervical medial branch blocks and radiofrequency ablations.  She also did 20 sessions of physical therapy at [facility] and aquatics at [place] in 2023 She did not notice any significant benefit.  Review of her recent MRI of her cervical spine which did reveal some disc bulge at C5-6 with moderate left neural foraminal stenosis.  She has previously had epidural steroid injections without any benefit.  Done 6 sessions of ketamine infusions with only the fourth infusion provided her with some relief.  Given the lack of response I would recommend stopping the ketamine infusion treatment.  Currently managing her pain with Lyrica 150 mg twice daily with modest benefit especially with her fibromyalgia pain.  I would up the dose 150 mg 3 times daily.  Previously she has tried multiple anti-inflammatories including  ibuprofen, Aleve, diclofenac, meloxicam, Celebrex with limited benefit.  Will trial her on etodolac.  Also discussed higher dose ketamine protocol at [facility], discussed spinal cord stimulator trial.  She is not interested in these options at this time.      Total time spent caring for the patient today was 35 minutes. This includes time spent before the visit reviewing the chart, time spent during the visit, and time spent after the visit on documentation.      Faizan Lang MD

## 2025-01-23 RX ORDER — KETOROLAC TROMETHAMINE 30 MG/ML
30 INJECTION, SOLUTION INTRAMUSCULAR; INTRAVENOUS ONCE
OUTPATIENT
Start: 2025-01-27 | End: 2025-01-27

## 2025-01-23 RX ORDER — KETAMINE HCL IN NACL, ISO-OSM 100MG/10ML
SYRINGE (ML) INJECTION ONCE
OUTPATIENT
Start: 2025-01-27

## 2025-01-27 ENCOUNTER — APPOINTMENT (OUTPATIENT)
Dept: INFUSION THERAPY | Facility: CLINIC | Age: 40
End: 2025-01-27
Payer: MEDICAID

## 2025-02-18 ENCOUNTER — TELEPHONE (OUTPATIENT)
Dept: NEUROSURGERY | Facility: CLINIC | Age: 40
End: 2025-02-18
Payer: MEDICAID

## 2025-02-18 ENCOUNTER — TELEPHONE (OUTPATIENT)
Dept: PAIN MEDICINE | Facility: CLINIC | Age: 40
End: 2025-02-18
Payer: MEDICAID

## 2025-02-18 DIAGNOSIS — M79.7 FIBROMYALGIA: ICD-10-CM

## 2025-02-18 DIAGNOSIS — M54.12 CERVICAL RADICULOPATHY: Primary | ICD-10-CM

## 2025-02-18 RX ORDER — ETODOLAC 200 MG/1
200 CAPSULE ORAL 2 TIMES DAILY
Qty: 60 CAPSULE | Refills: 1 | Status: SHIPPED | OUTPATIENT
Start: 2025-02-18 | End: 2025-04-19

## 2025-02-19 RX ORDER — TIZANIDINE 4 MG/1
4 TABLET ORAL EVERY 6 HOURS PRN
Qty: 80 TABLET | Refills: 0 | Status: SHIPPED | OUTPATIENT
Start: 2025-02-19 | End: 2025-03-21

## 2025-02-20 ENCOUNTER — APPOINTMENT (OUTPATIENT)
Dept: INFUSION THERAPY | Facility: CLINIC | Age: 40
End: 2025-02-20
Payer: MEDICAID

## 2025-03-12 ENCOUNTER — TELEPHONE (OUTPATIENT)
Dept: PAIN MEDICINE | Facility: CLINIC | Age: 40
End: 2025-03-12
Payer: MEDICAID

## 2025-03-12 DIAGNOSIS — M79.7 FIBROMYALGIA: ICD-10-CM

## 2025-03-12 RX ORDER — TIZANIDINE 4 MG/1
4 TABLET ORAL EVERY 6 HOURS PRN
Qty: 80 TABLET | Refills: 0 | Status: SHIPPED | OUTPATIENT
Start: 2025-03-12 | End: 2025-04-11

## 2025-04-29 ENCOUNTER — TELEPHONE (OUTPATIENT)
Dept: PAIN MEDICINE | Facility: CLINIC | Age: 40
End: 2025-04-29
Payer: MEDICAID

## 2025-04-29 DIAGNOSIS — M79.7 FIBROMYALGIA: ICD-10-CM

## 2025-04-29 RX ORDER — TIZANIDINE 4 MG/1
4 TABLET ORAL EVERY 6 HOURS PRN
Qty: 80 TABLET | Refills: 0 | Status: SHIPPED | OUTPATIENT
Start: 2025-04-29 | End: 2025-05-29

## 2025-06-05 DIAGNOSIS — Z51.81 THERAPEUTIC DRUG MONITORING: ICD-10-CM

## 2025-06-05 DIAGNOSIS — M79.7 FIBROMYALGIA: ICD-10-CM

## 2025-06-05 DIAGNOSIS — M54.12 CERVICAL RADICULOPATHY: ICD-10-CM

## 2025-06-06 RX ORDER — PREGABALIN 150 MG/1
150 CAPSULE ORAL 2 TIMES DAILY
Qty: 60 CAPSULE | Refills: 0 | Status: SHIPPED | OUTPATIENT
Start: 2025-06-06 | End: 2025-12-03